# Patient Record
Sex: FEMALE | Race: WHITE | NOT HISPANIC OR LATINO | URBAN - METROPOLITAN AREA
[De-identification: names, ages, dates, MRNs, and addresses within clinical notes are randomized per-mention and may not be internally consistent; named-entity substitution may affect disease eponyms.]

---

## 2019-06-22 ENCOUNTER — EMERGENCY (EMERGENCY)
Facility: HOSPITAL | Age: 74
LOS: 0 days | Discharge: HOME | End: 2019-06-23
Attending: EMERGENCY MEDICINE | Admitting: EMERGENCY MEDICINE
Payer: MEDICARE

## 2019-06-22 VITALS
TEMPERATURE: 98 F | DIASTOLIC BLOOD PRESSURE: 57 MMHG | HEIGHT: 70 IN | RESPIRATION RATE: 17 BRPM | WEIGHT: 225.97 LBS | OXYGEN SATURATION: 96 % | HEART RATE: 132 BPM | SYSTOLIC BLOOD PRESSURE: 127 MMHG

## 2019-06-22 DIAGNOSIS — R55 SYNCOPE AND COLLAPSE: ICD-10-CM

## 2019-06-22 DIAGNOSIS — I10 ESSENTIAL (PRIMARY) HYPERTENSION: ICD-10-CM

## 2019-06-22 DIAGNOSIS — Z96.649 PRESENCE OF UNSPECIFIED ARTIFICIAL HIP JOINT: Chronic | ICD-10-CM

## 2019-06-22 DIAGNOSIS — Z90.710 ACQUIRED ABSENCE OF BOTH CERVIX AND UTERUS: Chronic | ICD-10-CM

## 2019-06-22 DIAGNOSIS — I48.91 UNSPECIFIED ATRIAL FIBRILLATION: ICD-10-CM

## 2019-06-22 DIAGNOSIS — Z98.89 OTHER SPECIFIED POSTPROCEDURAL STATES: Chronic | ICD-10-CM

## 2019-06-22 LAB
ALBUMIN SERPL ELPH-MCNC: 3.6 G/DL — SIGNIFICANT CHANGE UP (ref 3.3–5)
ALP SERPL-CCNC: 84 U/L — SIGNIFICANT CHANGE UP (ref 40–120)
ALT FLD-CCNC: 28 U/L — SIGNIFICANT CHANGE UP (ref 12–78)
ANION GAP SERPL CALC-SCNC: 10 MMOL/L — SIGNIFICANT CHANGE UP (ref 5–17)
APTT BLD: 33.6 SEC — SIGNIFICANT CHANGE UP (ref 27.5–36.3)
AST SERPL-CCNC: 27 U/L — SIGNIFICANT CHANGE UP (ref 15–37)
BILIRUB SERPL-MCNC: 0.2 MG/DL — SIGNIFICANT CHANGE UP (ref 0.2–1.2)
BUN SERPL-MCNC: 26 MG/DL — HIGH (ref 7–23)
CALCIUM SERPL-MCNC: 9.1 MG/DL — SIGNIFICANT CHANGE UP (ref 8.5–10.1)
CHLORIDE SERPL-SCNC: 105 MMOL/L — SIGNIFICANT CHANGE UP (ref 96–108)
CO2 SERPL-SCNC: 23 MMOL/L — SIGNIFICANT CHANGE UP (ref 22–31)
CREAT SERPL-MCNC: 1.48 MG/DL — HIGH (ref 0.5–1.3)
GLUCOSE SERPL-MCNC: 150 MG/DL — HIGH (ref 70–99)
HCT VFR BLD CALC: 38.1 % — SIGNIFICANT CHANGE UP (ref 34.5–45)
HGB BLD-MCNC: 12.4 G/DL — SIGNIFICANT CHANGE UP (ref 11.5–15.5)
INR BLD: 1.34 RATIO — HIGH (ref 0.88–1.16)
MAGNESIUM SERPL-MCNC: 2 MG/DL — SIGNIFICANT CHANGE UP (ref 1.6–2.6)
MCHC RBC-ENTMCNC: 30.5 PG — SIGNIFICANT CHANGE UP (ref 27–34)
MCHC RBC-ENTMCNC: 32.5 GM/DL — SIGNIFICANT CHANGE UP (ref 32–36)
MCV RBC AUTO: 93.8 FL — SIGNIFICANT CHANGE UP (ref 80–100)
PLATELET # BLD AUTO: 196 K/UL — SIGNIFICANT CHANGE UP (ref 150–400)
POTASSIUM SERPL-MCNC: 3.6 MMOL/L — SIGNIFICANT CHANGE UP (ref 3.5–5.3)
POTASSIUM SERPL-SCNC: 3.6 MMOL/L — SIGNIFICANT CHANGE UP (ref 3.5–5.3)
PROT SERPL-MCNC: 7.5 GM/DL — SIGNIFICANT CHANGE UP (ref 6–8.3)
PROTHROM AB SERPL-ACNC: 15 SEC — HIGH (ref 10–12.9)
RBC # BLD: 4.06 M/UL — SIGNIFICANT CHANGE UP (ref 3.8–5.2)
RBC # FLD: 13.6 % — SIGNIFICANT CHANGE UP (ref 10.3–14.5)
SODIUM SERPL-SCNC: 138 MMOL/L — SIGNIFICANT CHANGE UP (ref 135–145)
WBC # BLD: 7.85 K/UL — SIGNIFICANT CHANGE UP (ref 3.8–10.5)
WBC # FLD AUTO: 7.85 K/UL — SIGNIFICANT CHANGE UP (ref 3.8–10.5)

## 2019-06-22 PROCEDURE — 99284 EMERGENCY DEPT VISIT MOD MDM: CPT | Mod: 25

## 2019-06-22 PROCEDURE — 93010 ELECTROCARDIOGRAM REPORT: CPT

## 2019-06-22 PROCEDURE — 71045 X-RAY EXAM CHEST 1 VIEW: CPT | Mod: 26

## 2019-06-22 PROCEDURE — 70450 CT HEAD/BRAIN W/O DYE: CPT | Mod: 26

## 2019-06-22 RX ORDER — SODIUM CHLORIDE 9 MG/ML
2000 INJECTION INTRAMUSCULAR; INTRAVENOUS; SUBCUTANEOUS ONCE
Refills: 0 | Status: COMPLETED | OUTPATIENT
Start: 2019-06-22 | End: 2019-06-22

## 2019-06-22 RX ORDER — DILTIAZEM HCL 120 MG
60 CAPSULE, EXT RELEASE 24 HR ORAL ONCE
Refills: 0 | Status: COMPLETED | OUTPATIENT
Start: 2019-06-22 | End: 2019-06-22

## 2019-06-22 RX ORDER — DILTIAZEM HCL 120 MG
10 CAPSULE, EXT RELEASE 24 HR ORAL ONCE
Refills: 0 | Status: COMPLETED | OUTPATIENT
Start: 2019-06-22 | End: 2019-06-22

## 2019-06-22 RX ORDER — DILTIAZEM HCL 120 MG
20 CAPSULE, EXT RELEASE 24 HR ORAL ONCE
Refills: 0 | Status: COMPLETED | OUTPATIENT
Start: 2019-06-22 | End: 2019-06-22

## 2019-06-22 RX ADMIN — Medication 60 MILLIGRAM(S): at 21:56

## 2019-06-22 RX ADMIN — SODIUM CHLORIDE 2000 MILLILITER(S): 9 INJECTION INTRAMUSCULAR; INTRAVENOUS; SUBCUTANEOUS at 22:10

## 2019-06-22 RX ADMIN — Medication 10 MILLIGRAM(S): at 22:13

## 2019-06-22 RX ADMIN — Medication 20 MILLIGRAM(S): at 21:21

## 2019-06-22 NOTE — ED PROVIDER NOTE - PROGRESS NOTE DETAILS
AS:  received at shift change from Dr Chou, pending second troponin.  TRop neg x 2.  Pt anxious for d/c home.  Will f/u as out pt

## 2019-06-22 NOTE — ED PROVIDER NOTE - NS_ ATTENDINGSCRIBEDETAILS _ED_A_ED_FT
I, Damon Chou MD,  performed the initial face to face bedside interview with this patient regarding history of present illness, review of symptoms and relevant past medical, social and family history.  I completed an independent physical examination.    The history, relevant review of systems, past medical and surgical history, medical decision making, and physical examination was documented by the scribe in my presence and I attest to the accuracy of the documentation.

## 2019-06-22 NOTE — ED ADULT TRIAGE NOTE - CHIEF COMPLAINT QUOTE
Pt presents to ER s/p witnessed syncopal episode 25 minutes PTA. Pt slide out of chair. Denies hitting head. Pt had oblation on Friday r/t Afib

## 2019-06-22 NOTE — ED PROVIDER NOTE - OBJECTIVE STATEMENT
74 y/o female with a PMHx of Afib presents to the ED s/p syncope today. States that episode occurred while she was eating at a restaurant. Pt states that she had a couple glasses of wine at the restaurant. Friends at the pt's bedside states that they noticed the pt slump down and slide out of her seat to the floor. Pt states that she had a recent ablation at [facility] a week ago for Afib.

## 2019-06-22 NOTE — ED ADULT NURSE NOTE - NSIMPLEMENTINTERV_GEN_ALL_ED
Implemented All Fall Risk Interventions:  [place] to call system. Call bell, personal items and telephone within reach. Instruct patient to call for assistance. Room bathroom lighting operational. Non-slip footwear when patient is off stretcher. Physically safe environment: no spills, clutter or unnecessary equipment. Stretcher in lowest position, wheels locked, appropriate side rails in place. Provide visual cue, wrist band, yellow gown, etc. Monitor gait and stability. Monitor for mental status changes and reorient to person, place, and time. Review medications for side effects contributing to fall risk. Reinforce activity limits and safety measures with patient and family.

## 2019-06-22 NOTE — ED ADULT NURSE NOTE - OBJECTIVE STATEMENT
Pt biba s/p syncopal episode at restaurant.  Pt stated that she had ablation done last week and was placed on   amiodarone. Today she had 3 glasses of wine at home, then today at the restaurant  she had 1.5 prosecco. She was also on a keto diet and today was the first day she had bread.  One she was on her 2nd prosecco drink she synopsized and slipped out her chair onto the floor.

## 2019-06-22 NOTE — ED PROVIDER NOTE - NSFOLLOWUPINSTRUCTIONS_ED_ALL_ED_FT
Follow up with your doctor Monday  Stay well hydrated  Continue current medications  Return to ED for any further concerns    Syncope    Syncope is when you temporarily lose consciousness, also called fainting or passing out. It is caused by a sudden decrease in blood flow to the brain. Even though most causes of syncope are not dangerous, syncope can possibly be a sign of a serious medical problem. Signs that you may be about to faint include feeling dizzy, lightheaded, nausea, visual changes, or cold/clammy skin. Do not drive, operate heavy machinery, or play sports until your health care provider says it is okay.    SEEK IMMEDIATE MEDICAL CARE IF YOU HAVE ANY OF THE FOLLOWING SYMPTOMS: severe headache, pain in your chest/abdomen/back, bleeding from your mouth or rectum, palpitations, shortness of breath, pain with breathing, seizure, confusion, or trouble walking.

## 2019-06-23 VITALS
OXYGEN SATURATION: 100 % | DIASTOLIC BLOOD PRESSURE: 55 MMHG | HEART RATE: 92 BPM | RESPIRATION RATE: 18 BRPM | SYSTOLIC BLOOD PRESSURE: 120 MMHG | TEMPERATURE: 98 F

## 2019-06-23 LAB — TROPONIN I SERPL-MCNC: <0.015 NG/ML — SIGNIFICANT CHANGE UP (ref 0.01–0.04)

## 2020-10-06 ENCOUNTER — EMERGENCY (EMERGENCY)
Facility: HOSPITAL | Age: 75
LOS: 1 days | Discharge: HOME | End: 2020-10-06
Attending: EMERGENCY MEDICINE | Admitting: EMERGENCY MEDICINE
Payer: MEDICARE

## 2020-10-06 VITALS
SYSTOLIC BLOOD PRESSURE: 138 MMHG | TEMPERATURE: 98 F | DIASTOLIC BLOOD PRESSURE: 73 MMHG | RESPIRATION RATE: 16 BRPM | OXYGEN SATURATION: 98 % | HEART RATE: 116 BPM

## 2020-10-06 VITALS
HEART RATE: 125 BPM | OXYGEN SATURATION: 97 % | TEMPERATURE: 98 F | DIASTOLIC BLOOD PRESSURE: 82 MMHG | HEIGHT: 70 IN | WEIGHT: 229.94 LBS | SYSTOLIC BLOOD PRESSURE: 112 MMHG | RESPIRATION RATE: 18 BRPM

## 2020-10-06 DIAGNOSIS — Z96.649 PRESENCE OF UNSPECIFIED ARTIFICIAL HIP JOINT: Chronic | ICD-10-CM

## 2020-10-06 DIAGNOSIS — Z90.710 ACQUIRED ABSENCE OF BOTH CERVIX AND UTERUS: Chronic | ICD-10-CM

## 2020-10-06 DIAGNOSIS — Z98.89 OTHER SPECIFIED POSTPROCEDURAL STATES: Chronic | ICD-10-CM

## 2020-10-06 PROBLEM — I48.91 UNSPECIFIED ATRIAL FIBRILLATION: Chronic | Status: ACTIVE | Noted: 2019-06-29

## 2020-10-06 LAB
ALBUMIN SERPL ELPH-MCNC: 3.5 G/DL — SIGNIFICANT CHANGE UP (ref 3.3–5)
ALP SERPL-CCNC: 86 U/L — SIGNIFICANT CHANGE UP (ref 40–120)
ALT FLD-CCNC: 28 U/L — SIGNIFICANT CHANGE UP (ref 12–78)
ANION GAP SERPL CALC-SCNC: 6 MMOL/L — SIGNIFICANT CHANGE UP (ref 5–17)
AST SERPL-CCNC: 24 U/L — SIGNIFICANT CHANGE UP (ref 15–37)
BASOPHILS # BLD AUTO: 0.03 K/UL — SIGNIFICANT CHANGE UP (ref 0–0.2)
BASOPHILS NFR BLD AUTO: 0.4 % — SIGNIFICANT CHANGE UP (ref 0–2)
BILIRUB SERPL-MCNC: 0.3 MG/DL — SIGNIFICANT CHANGE UP (ref 0.2–1.2)
BUN SERPL-MCNC: 13 MG/DL — SIGNIFICANT CHANGE UP (ref 7–23)
CALCIUM SERPL-MCNC: 9.5 MG/DL — SIGNIFICANT CHANGE UP (ref 8.5–10.1)
CHLORIDE SERPL-SCNC: 106 MMOL/L — SIGNIFICANT CHANGE UP (ref 96–108)
CO2 SERPL-SCNC: 29 MMOL/L — SIGNIFICANT CHANGE UP (ref 22–31)
CREAT SERPL-MCNC: 1.1 MG/DL — SIGNIFICANT CHANGE UP (ref 0.5–1.3)
EOSINOPHIL # BLD AUTO: 0.09 K/UL — SIGNIFICANT CHANGE UP (ref 0–0.5)
EOSINOPHIL NFR BLD AUTO: 1.2 % — SIGNIFICANT CHANGE UP (ref 0–6)
GLUCOSE SERPL-MCNC: 117 MG/DL — HIGH (ref 70–99)
HCT VFR BLD CALC: 34.2 % — LOW (ref 34.5–45)
HGB BLD-MCNC: 11.3 G/DL — LOW (ref 11.5–15.5)
IMM GRANULOCYTES NFR BLD AUTO: 0.5 % — SIGNIFICANT CHANGE UP (ref 0–1.5)
LYMPHOCYTES # BLD AUTO: 1.82 K/UL — SIGNIFICANT CHANGE UP (ref 1–3.3)
LYMPHOCYTES # BLD AUTO: 23.5 % — SIGNIFICANT CHANGE UP (ref 13–44)
MCHC RBC-ENTMCNC: 27.5 PG — SIGNIFICANT CHANGE UP (ref 27–34)
MCHC RBC-ENTMCNC: 33 GM/DL — SIGNIFICANT CHANGE UP (ref 32–36)
MCV RBC AUTO: 83.2 FL — SIGNIFICANT CHANGE UP (ref 80–100)
MONOCYTES # BLD AUTO: 0.63 K/UL — SIGNIFICANT CHANGE UP (ref 0–0.9)
MONOCYTES NFR BLD AUTO: 8.1 % — SIGNIFICANT CHANGE UP (ref 2–14)
NEUTROPHILS # BLD AUTO: 5.14 K/UL — SIGNIFICANT CHANGE UP (ref 1.8–7.4)
NEUTROPHILS NFR BLD AUTO: 66.3 % — SIGNIFICANT CHANGE UP (ref 43–77)
NRBC # BLD: 0 /100 WBCS — SIGNIFICANT CHANGE UP (ref 0–0)
NT-PROBNP SERPL-SCNC: 1080 PG/ML — HIGH (ref 0–450)
PLATELET # BLD AUTO: 236 K/UL — SIGNIFICANT CHANGE UP (ref 150–400)
POTASSIUM SERPL-MCNC: 4 MMOL/L — SIGNIFICANT CHANGE UP (ref 3.5–5.3)
POTASSIUM SERPL-SCNC: 4 MMOL/L — SIGNIFICANT CHANGE UP (ref 3.5–5.3)
PROT SERPL-MCNC: 7.6 G/DL — SIGNIFICANT CHANGE UP (ref 6–8.3)
RBC # BLD: 4.11 M/UL — SIGNIFICANT CHANGE UP (ref 3.8–5.2)
RBC # FLD: 18.5 % — HIGH (ref 10.3–14.5)
SODIUM SERPL-SCNC: 141 MMOL/L — SIGNIFICANT CHANGE UP (ref 135–145)
TROPONIN I SERPL-MCNC: <.015 NG/ML — SIGNIFICANT CHANGE UP (ref 0.01–0.04)
WBC # BLD: 7.75 K/UL — SIGNIFICANT CHANGE UP (ref 3.8–10.5)
WBC # FLD AUTO: 7.75 K/UL — SIGNIFICANT CHANGE UP (ref 3.8–10.5)

## 2020-10-06 PROCEDURE — 80053 COMPREHEN METABOLIC PANEL: CPT

## 2020-10-06 PROCEDURE — 83880 ASSAY OF NATRIURETIC PEPTIDE: CPT

## 2020-10-06 PROCEDURE — 99284 EMERGENCY DEPT VISIT MOD MDM: CPT | Mod: 25

## 2020-10-06 PROCEDURE — 84484 ASSAY OF TROPONIN QUANT: CPT

## 2020-10-06 PROCEDURE — 99283 EMERGENCY DEPT VISIT LOW MDM: CPT

## 2020-10-06 PROCEDURE — 71045 X-RAY EXAM CHEST 1 VIEW: CPT

## 2020-10-06 PROCEDURE — 93010 ELECTROCARDIOGRAM REPORT: CPT

## 2020-10-06 PROCEDURE — 71045 X-RAY EXAM CHEST 1 VIEW: CPT | Mod: 26

## 2020-10-06 PROCEDURE — 93005 ELECTROCARDIOGRAM TRACING: CPT

## 2020-10-06 PROCEDURE — 36415 COLL VENOUS BLD VENIPUNCTURE: CPT

## 2020-10-06 PROCEDURE — 85025 COMPLETE CBC W/AUTO DIFF WBC: CPT

## 2020-10-06 RX ORDER — DILTIAZEM HCL 120 MG
30 CAPSULE, EXT RELEASE 24 HR ORAL ONCE
Refills: 0 | Status: COMPLETED | OUTPATIENT
Start: 2020-10-06 | End: 2020-10-06

## 2020-10-06 RX ADMIN — Medication 30 MILLIGRAM(S): at 12:56

## 2020-10-06 NOTE — ED PROVIDER NOTE - PATIENT PORTAL LINK FT
You can access the FollowMyHealth Patient Portal offered by [facility] by registering at the following website: [email or web address]. By joining [employer]’s FollowMyHealth portal, you will also be able to view your health information using other applications (apps) compatible with our system.

## 2020-10-06 NOTE — ED PROVIDER NOTE - PROGRESS NOTE DETAILS
case shahana Anthony, ekg from last month shows afib 112.  If hr about 110 and s symptoms, can be dc home.  Pt is scheduled for pacemaker.

## 2020-10-06 NOTE — ED PROVIDER NOTE - NSFOLLOWUPINSTRUCTIONS_ED_ALL_ED_FT
[employer]® CareNotes®     :  [facility]  	                       A-FIB (ATRIAL FIBRILLATION) - Discharge Care           A-fib (Atrial Fibrillation)    WHAT YOU NEED TO KNOW:    A-fib may come and go, or it may be a long-term condition. A-fib can cause blood clots, stroke, or heart failure. These conditions may become life-threatening. It is important to treat and manage a-fib to help prevent a blood clot, stroke, or heart failure.     Heart Chambers         DISCHARGE INSTRUCTIONS:    Call your local emergency number (911 in the ) if:   •You have any of the following signs of a heart attack: ?Squeezing, pressure, or pain in your chest      ?You may also have any of the following: ?Discomfort or pain in your back, neck, jaw, stomach, or arm      ?Shortness of breath      ?Nausea or vomiting      ?Lightheadedness or a sudden cold sweat        •You have any of the following signs of a stroke: ?Numbness or drooping on one side of your face       ?Weakness in an arm or leg      ?Confusion or difficulty speaking      ?Dizziness, a severe headache, or vision loss        Call your cardiologist if:   •Your arm or leg feels warm, tender, and painful. It may look swollen and red.      •Your heart rate is more than 110 beats per minute.       •You have new or worsening swelling in your legs, feet, ankles, or abdomen.       •You are short of breath, even at rest.       •You have questions or concerns about your condition or care.       Medicines: You may need any of the following:   •Heart medicines help control your heart rate or rhythm. You may need more than one medicine to treat your symptoms.       •Blood thinners help prevent blood clots. Clots can cause strokes, heart attacks, and death. The following are general safety guidelines to follow while you are taking a blood thinner:?Watch for bleeding and bruising while you take blood thinners. Watch for bleeding from your gums or nose. Watch for blood in your urine and bowel movements. Use a soft washcloth on your skin, and a soft toothbrush to brush your teeth. This can keep your skin and gums from bleeding. If you shave, use an electric shaver. Do not play contact sports.       ?Tell your dentist and other healthcare providers that you take a blood thinner. Wear a bracelet or necklace that says you take this medicine.       ?Do not start or stop any other medicines unless your healthcare provider tells you to. Many medicines cannot be used with blood thinners.      ?Take your blood thinner exactly as prescribed by your healthcare provider. Do not skip does or take less than prescribed. Tell your provider right away if you forget to take your blood thinner, or if you take too much.      ?Warfarin is a blood thinner that you may need to take. The following are things you should be aware of if you take warfarin: ?Foods and medicines can affect the amount of warfarin in your blood. Do not make major changes to your diet while you take warfarin. Warfarin works best when you eat about the same amount of vitamin K every day. Vitamin K is found in green leafy vegetables and certain other foods. Ask for more information about what to eat when you are taking warfarin.      ?You will need to see your healthcare provider for follow-up visits when you are on warfarin. You will need regular blood tests. These tests are used to decide how much medicine you need.         •Antiplatelets, such as aspirin, help prevent blood clots. Take your antiplatelet medicine exactly as directed. These medicines make it more likely for you to bleed or bruise. If you are told to take aspirin, do not take acetaminophen or ibuprofen instead.       •Take your medicine as directed. Contact your healthcare provider if you think your medicine is not helping or if you have side effects. Tell him or her if you are allergic to any medicine. Keep a list of the medicines, vitamins, and herbs you take. Include the amounts, and when and why you take them. Bring the list or the pill bottles to follow-up visits. Carry your medicine list with you in case of an emergency.      Manage A-fib:   •Know your target heart rate. Learn how to check your pulse and monitor your heart rate.      •Know the risks if you choose to drink alcohol. Alcohol can make a-fib hard to manage. Ask your healthcare provider if it is safe for you to drink alcohol. A drink of alcohol is 12 ounces of beer, 5 ounces of wine, or 1½ ounces of liquor.       •Do not smoke. Nicotine can cause heart damage and make it more difficult to manage your a-fib. Do not use e-cigarettes or smokeless tobacco in place of cigarettes or to help you quit. They still contain nicotine. Ask your healthcare provider for information if you currently smoke and need help quitting.      •Eat heart-healthy foods. Heart healthy foods will help keep your cholesterol low. These include fruits, vegetables, whole-grain breads, low-fat dairy products, beans, lean meats, and fish. Replace butter and margarine with heart-healthy oils such as olive oil and canola oil.       •Maintain a healthy weight. Ask your healthcare provider how much you should weigh. Ask him or her to help you create a safe weight loss plan if you are overweight. Even a small goal of a 10% weight loss can improve your heart health.       •Get regular physical activity. Physical activity helps improve your heart health. Get at least 150 minutes of moderate aerobic physical activity each week. Your healthcare provider can help you create an activity plan.   Walking for Exercise           •Manage other health conditions. This includes high blood pressure or cholesterol, sleep apnea, diabetes, and other heart conditions. Take medicine as directed and follow your treatment plan.       Follow up with your cardiologist as directed: You will need regular blood tests and monitoring. Write down your questions so you remember to ask them during your visits.        © Copyright [employer] 2020           back to top                          © Copyright [employer] 2020

## 2020-10-06 NOTE — ED ADULT NURSE NOTE - CHIEF COMPLAINT QUOTE
patient came in ED from Urgent care BIBA with c/o cough and on Afib rhythm. patient denies chest pain, denies dizziness.

## 2020-10-06 NOTE — ED ADULT NURSE NOTE - PSH
History of colonoscopy  (2014)  S/P hip replacement  (Left hip, 2010)  S/P hysterectomy with oophorectomy  (Age 45)

## 2020-10-06 NOTE — ED ADULT NURSE NOTE - OBJECTIVE STATEMENT
Pt sent from urgent care with Afib with HR in 120's. Per pt this is normal. She states shes ha AFIB "for years" and the elevated rate is common. Pt is due to have a pacemaker placed next week. Pt denies CP, SOB, and dizziness.

## 2021-03-18 ENCOUNTER — INPATIENT (INPATIENT)
Facility: HOSPITAL | Age: 76
LOS: 2 days | Discharge: HOME | DRG: 378 | End: 2021-03-21
Attending: INTERNAL MEDICINE | Admitting: INTERNAL MEDICINE
Payer: MEDICARE

## 2021-03-18 VITALS
TEMPERATURE: 98 F | HEIGHT: 70 IN | OXYGEN SATURATION: 94 % | DIASTOLIC BLOOD PRESSURE: 75 MMHG | RESPIRATION RATE: 16 BRPM | HEART RATE: 77 BPM | SYSTOLIC BLOOD PRESSURE: 106 MMHG | WEIGHT: 201.94 LBS

## 2021-03-18 DIAGNOSIS — Z29.9 ENCOUNTER FOR PROPHYLACTIC MEASURES, UNSPECIFIED: ICD-10-CM

## 2021-03-18 DIAGNOSIS — K44.9 DIAPHRAGMATIC HERNIA WITHOUT OBSTRUCTION OR GANGRENE: ICD-10-CM

## 2021-03-18 DIAGNOSIS — R55 SYNCOPE AND COLLAPSE: ICD-10-CM

## 2021-03-18 DIAGNOSIS — E78.5 HYPERLIPIDEMIA, UNSPECIFIED: ICD-10-CM

## 2021-03-18 DIAGNOSIS — D64.9 ANEMIA, UNSPECIFIED: ICD-10-CM

## 2021-03-18 DIAGNOSIS — Z98.89 OTHER SPECIFIED POSTPROCEDURAL STATES: Chronic | ICD-10-CM

## 2021-03-18 DIAGNOSIS — I48.91 UNSPECIFIED ATRIAL FIBRILLATION: ICD-10-CM

## 2021-03-18 DIAGNOSIS — N18.9 CHRONIC KIDNEY DISEASE, UNSPECIFIED: ICD-10-CM

## 2021-03-18 DIAGNOSIS — I10 ESSENTIAL (PRIMARY) HYPERTENSION: ICD-10-CM

## 2021-03-18 DIAGNOSIS — Z90.710 ACQUIRED ABSENCE OF BOTH CERVIX AND UTERUS: Chronic | ICD-10-CM

## 2021-03-18 DIAGNOSIS — I71.2 THORACIC AORTIC ANEURYSM, WITHOUT RUPTURE: ICD-10-CM

## 2021-03-18 DIAGNOSIS — Z96.649 PRESENCE OF UNSPECIFIED ARTIFICIAL HIP JOINT: Chronic | ICD-10-CM

## 2021-03-18 DIAGNOSIS — I25.10 ATHEROSCLEROTIC HEART DISEASE OF NATIVE CORONARY ARTERY WITHOUT ANGINA PECTORIS: ICD-10-CM

## 2021-03-18 LAB
ALBUMIN SERPL ELPH-MCNC: 3.5 G/DL — SIGNIFICANT CHANGE UP (ref 3.3–5)
ALP SERPL-CCNC: 88 U/L — SIGNIFICANT CHANGE UP (ref 40–120)
ALT FLD-CCNC: 16 U/L — SIGNIFICANT CHANGE UP (ref 12–78)
ANION GAP SERPL CALC-SCNC: 10 MMOL/L — SIGNIFICANT CHANGE UP (ref 5–17)
ANISOCYTOSIS BLD QL: SIGNIFICANT CHANGE UP
APPEARANCE UR: ABNORMAL
APTT BLD: 36.1 SEC — HIGH (ref 27.5–35.5)
AST SERPL-CCNC: 14 U/L — LOW (ref 15–37)
BACTERIA # UR AUTO: ABNORMAL
BASOPHILS # BLD AUTO: 0.03 K/UL — SIGNIFICANT CHANGE UP (ref 0–0.2)
BASOPHILS NFR BLD AUTO: 0.4 % — SIGNIFICANT CHANGE UP (ref 0–2)
BILIRUB SERPL-MCNC: 0.3 MG/DL — SIGNIFICANT CHANGE UP (ref 0.2–1.2)
BILIRUB UR-MCNC: NEGATIVE — SIGNIFICANT CHANGE UP
BLD GP AB SCN SERPL QL: SIGNIFICANT CHANGE UP
BUN SERPL-MCNC: 31 MG/DL — HIGH (ref 7–23)
CALCIUM SERPL-MCNC: 9.4 MG/DL — SIGNIFICANT CHANGE UP (ref 8.5–10.1)
CHLORIDE SERPL-SCNC: 103 MMOL/L — SIGNIFICANT CHANGE UP (ref 96–108)
CK MB CFR SERPL CALC: <1 NG/ML — SIGNIFICANT CHANGE UP (ref 0–3.6)
CO2 SERPL-SCNC: 25 MMOL/L — SIGNIFICANT CHANGE UP (ref 22–31)
COLOR SPEC: YELLOW — SIGNIFICANT CHANGE UP
COMMENT - URINE: SIGNIFICANT CHANGE UP
CREAT SERPL-MCNC: 1.2 MG/DL — SIGNIFICANT CHANGE UP (ref 0.5–1.3)
DIFF PNL FLD: NEGATIVE — SIGNIFICANT CHANGE UP
EOSINOPHIL # BLD AUTO: 0.03 K/UL — SIGNIFICANT CHANGE UP (ref 0–0.5)
EOSINOPHIL NFR BLD AUTO: 0.4 % — SIGNIFICANT CHANGE UP (ref 0–6)
EPI CELLS # UR: ABNORMAL
GLUCOSE SERPL-MCNC: 122 MG/DL — HIGH (ref 70–99)
GLUCOSE UR QL: NEGATIVE — SIGNIFICANT CHANGE UP
HCT VFR BLD CALC: 26 % — LOW (ref 34.5–45)
HGB BLD-MCNC: 7.8 G/DL — LOW (ref 11.5–15.5)
HYPOCHROMIA BLD QL: SLIGHT — SIGNIFICANT CHANGE UP
IMM GRANULOCYTES NFR BLD AUTO: 0.5 % — SIGNIFICANT CHANGE UP (ref 0–1.5)
INR BLD: 2.59 RATIO — HIGH (ref 0.88–1.16)
KETONES UR-MCNC: ABNORMAL
LEUKOCYTE ESTERASE UR-ACNC: NEGATIVE — SIGNIFICANT CHANGE UP
LIDOCAIN IGE QN: 145 U/L — SIGNIFICANT CHANGE UP (ref 73–393)
LYMPHOCYTES # BLD AUTO: 1.02 K/UL — SIGNIFICANT CHANGE UP (ref 1–3.3)
LYMPHOCYTES # BLD AUTO: 13.1 % — SIGNIFICANT CHANGE UP (ref 13–44)
MACROCYTES BLD QL: SLIGHT — SIGNIFICANT CHANGE UP
MAGNESIUM SERPL-MCNC: 2 MG/DL — SIGNIFICANT CHANGE UP (ref 1.6–2.6)
MANUAL SMEAR VERIFICATION: SIGNIFICANT CHANGE UP
MCHC RBC-ENTMCNC: 27.4 PG — SIGNIFICANT CHANGE UP (ref 27–34)
MCHC RBC-ENTMCNC: 30 GM/DL — LOW (ref 32–36)
MCV RBC AUTO: 91.2 FL — SIGNIFICANT CHANGE UP (ref 80–100)
MICROCYTES BLD QL: SLIGHT — SIGNIFICANT CHANGE UP
MONOCYTES # BLD AUTO: 0.48 K/UL — SIGNIFICANT CHANGE UP (ref 0–0.9)
MONOCYTES NFR BLD AUTO: 6.2 % — SIGNIFICANT CHANGE UP (ref 2–14)
NEUTROPHILS # BLD AUTO: 6.2 K/UL — SIGNIFICANT CHANGE UP (ref 1.8–7.4)
NEUTROPHILS NFR BLD AUTO: 79.4 % — HIGH (ref 43–77)
NITRITE UR-MCNC: NEGATIVE — SIGNIFICANT CHANGE UP
NRBC # BLD: 0 /100 WBCS — SIGNIFICANT CHANGE UP (ref 0–0)
NT-PROBNP SERPL-SCNC: 1084 PG/ML — HIGH (ref 0–450)
PH UR: 5 — SIGNIFICANT CHANGE UP (ref 5–8)
PLAT MORPH BLD: NORMAL — SIGNIFICANT CHANGE UP
PLATELET # BLD AUTO: 321 K/UL — SIGNIFICANT CHANGE UP (ref 150–400)
POIKILOCYTOSIS BLD QL AUTO: SLIGHT — SIGNIFICANT CHANGE UP
POLYCHROMASIA BLD QL SMEAR: SLIGHT — SIGNIFICANT CHANGE UP
POTASSIUM SERPL-MCNC: 3.3 MMOL/L — LOW (ref 3.5–5.3)
POTASSIUM SERPL-SCNC: 3.3 MMOL/L — LOW (ref 3.5–5.3)
PROT SERPL-MCNC: 7.3 G/DL — SIGNIFICANT CHANGE UP (ref 6–8.3)
PROT UR-MCNC: NEGATIVE — SIGNIFICANT CHANGE UP
PROTHROM AB SERPL-ACNC: 29 SEC — HIGH (ref 10.6–13.6)
RBC # BLD: 2.85 M/UL — LOW (ref 3.8–5.2)
RBC # FLD: 20.5 % — HIGH (ref 10.3–14.5)
RBC BLD AUTO: ABNORMAL
SARS-COV-2 RNA SPEC QL NAA+PROBE: SIGNIFICANT CHANGE UP
SODIUM SERPL-SCNC: 138 MMOL/L — SIGNIFICANT CHANGE UP (ref 135–145)
SP GR SPEC: 1.02 — SIGNIFICANT CHANGE UP (ref 1.01–1.02)
TROPONIN I SERPL-MCNC: <.015 NG/ML — SIGNIFICANT CHANGE UP (ref 0.01–0.04)
TSH SERPL-MCNC: 1.26 UIU/ML — SIGNIFICANT CHANGE UP (ref 0.36–3.74)
UROBILINOGEN FLD QL: NEGATIVE — SIGNIFICANT CHANGE UP
WBC # BLD: 7.8 K/UL — SIGNIFICANT CHANGE UP (ref 3.8–10.5)
WBC # FLD AUTO: 7.8 K/UL — SIGNIFICANT CHANGE UP (ref 3.8–10.5)
WBC UR QL: SIGNIFICANT CHANGE UP

## 2021-03-18 PROCEDURE — 99223 1ST HOSP IP/OBS HIGH 75: CPT | Mod: GC,AI

## 2021-03-18 PROCEDURE — 99285 EMERGENCY DEPT VISIT HI MDM: CPT | Mod: CS

## 2021-03-18 PROCEDURE — 71045 X-RAY EXAM CHEST 1 VIEW: CPT | Mod: 26

## 2021-03-18 RX ORDER — PANTOPRAZOLE SODIUM 20 MG/1
40 TABLET, DELAYED RELEASE ORAL
Refills: 0 | Status: DISCONTINUED | OUTPATIENT
Start: 2021-03-18 | End: 2021-03-20

## 2021-03-18 RX ORDER — POTASSIUM CHLORIDE 20 MEQ
40 PACKET (EA) ORAL ONCE
Refills: 0 | Status: COMPLETED | OUTPATIENT
Start: 2021-03-18 | End: 2021-03-18

## 2021-03-18 RX ORDER — ACETAMINOPHEN 500 MG
650 TABLET ORAL ONCE
Refills: 0 | Status: COMPLETED | OUTPATIENT
Start: 2021-03-18 | End: 2021-03-18

## 2021-03-18 RX ORDER — FERROUS SULFATE 325(65) MG
325 TABLET ORAL DAILY
Refills: 0 | Status: DISCONTINUED | OUTPATIENT
Start: 2021-03-18 | End: 2021-03-21

## 2021-03-18 RX ORDER — METOPROLOL TARTRATE 50 MG
1 TABLET ORAL
Qty: 0 | Refills: 0 | DISCHARGE

## 2021-03-18 RX ORDER — SODIUM CHLORIDE 9 MG/ML
1000 INJECTION INTRAMUSCULAR; INTRAVENOUS; SUBCUTANEOUS ONCE
Refills: 0 | Status: COMPLETED | OUTPATIENT
Start: 2021-03-18 | End: 2021-03-18

## 2021-03-18 RX ORDER — FERROUS SULFATE 325(65) MG
1 TABLET ORAL
Qty: 0 | Refills: 0 | DISCHARGE

## 2021-03-18 RX ORDER — LIDOCAINE 4 G/100G
1 CREAM TOPICAL ONCE
Refills: 0 | Status: COMPLETED | OUTPATIENT
Start: 2021-03-18 | End: 2021-03-18

## 2021-03-18 RX ORDER — LANOLIN ALCOHOL/MO/W.PET/CERES
5 CREAM (GRAM) TOPICAL AT BEDTIME
Refills: 0 | Status: DISCONTINUED | OUTPATIENT
Start: 2021-03-18 | End: 2021-03-21

## 2021-03-18 RX ORDER — RIVAROXABAN 15 MG-20MG
1 KIT ORAL
Qty: 0 | Refills: 0 | DISCHARGE

## 2021-03-18 RX ORDER — ATORVASTATIN CALCIUM 80 MG/1
10 TABLET, FILM COATED ORAL AT BEDTIME
Refills: 0 | Status: DISCONTINUED | OUTPATIENT
Start: 2021-03-18 | End: 2021-03-21

## 2021-03-18 RX ADMIN — Medication 5 MILLIGRAM(S): at 21:25

## 2021-03-18 RX ADMIN — Medication 325 MILLIGRAM(S): at 15:45

## 2021-03-18 RX ADMIN — Medication 40 MILLIEQUIVALENT(S): at 15:45

## 2021-03-18 RX ADMIN — SODIUM CHLORIDE 1000 MILLILITER(S): 9 INJECTION INTRAMUSCULAR; INTRAVENOUS; SUBCUTANEOUS at 12:10

## 2021-03-18 RX ADMIN — LIDOCAINE 1 PATCH: 4 CREAM TOPICAL at 21:25

## 2021-03-18 RX ADMIN — Medication 650 MILLIGRAM(S): at 21:18

## 2021-03-18 RX ADMIN — SODIUM CHLORIDE 1000 MILLILITER(S): 9 INJECTION INTRAMUSCULAR; INTRAVENOUS; SUBCUTANEOUS at 11:10

## 2021-03-18 RX ADMIN — PANTOPRAZOLE SODIUM 40 MILLIGRAM(S): 20 TABLET, DELAYED RELEASE ORAL at 17:53

## 2021-03-18 RX ADMIN — Medication 650 MILLIGRAM(S): at 20:15

## 2021-03-18 RX ADMIN — ATORVASTATIN CALCIUM 10 MILLIGRAM(S): 80 TABLET, FILM COATED ORAL at 21:25

## 2021-03-18 NOTE — ED ADULT TRIAGE NOTE - CHIEF COMPLAINT QUOTE
pt sent from cardioogist office for dizziness, near syncope, pt pale, denies chest pain, c/o headaches with chronic sob, also hx of anemia and not taking iron as prescribed

## 2021-03-18 NOTE — H&P ADULT - NSHPSOCIALHISTORY_GEN_ALL_CORE
Lives alone. Former smoker quit 30 years ago, smoked 1 ppd for 20 years previously, social drinker Lives alone. Former smoker quit 30 years ago, smoked 1 ppd for 20 years previously, social drinker  no family hx of colon cancer

## 2021-03-18 NOTE — H&P ADULT - NSICDXFAMILYHX_GEN_ALL_CORE_FT
FAMILY HISTORY:  Mother  Still living? No  Family history of abdominal aortic aneurysm, Age at diagnosis: Age Unknown    Sibling  Still living? Yes, Estimated age: 71-80  Family history of cardiomyopathy, Age at diagnosis: Age Unknown

## 2021-03-18 NOTE — H&P ADULT - PROBLEM SELECTOR PLAN 1
Pt has a hx of chronic iron deficiency of anemia of unknown etiology after evaluation with upper and lower EGD by Dante  presented with symptomatic anemia, Hgb on admission 7.8  - s/p 1 unit in the ED  - Continue ferrous sulfate  - GI consult (Dr. Hooker Pt has a hx of chronic iron deficiency of anemia of unknown etiology after evaluation with upper and lower EGD by Dante and presented with symptomatic anemia after not taking iron tabs due to constipation, Hgb on admission 7.8  - No signs of active bleeding  - s/p 1 unit in the ED  - Continue ferrous sulfate  - GI consult (Dr. Hooker), f/u recs  - Start IV protonix 40 mg BID  - Check FOBT  - Hold Xarelto  - Monitor CBCs, transfuse for Hgb <8 Pt has a hx of chronic iron deficiency of anemia of unknown etiology after evaluation with upper and lower EGD by Dante and presented with symptomatic anemia after not taking iron tabs due to constipation, Hgb on admission 7.8  - No signs of active bleeding  - s/p 1 unit PRBC in the ED  - Continue ferrous sulfate  - GI consult (Dr. Hooker), f/u recs  - Start IV protonix 40 mg BID  - Check FOBT  - Hold Xarelto  - Monitor CBCs, transfuse for Hgb <8 Pt has a hx of chronic iron deficiency of anemia of unknown etiology after evaluation with upper and lower EGD by Dante and presented with symptomatic anemia after not taking iron tabs due to constipation, Hgb on admission 7.8  - No signs of active bleeding  - s/p 1 unit PRBC in the ED  - Continue ferrous sulfate  - GI consult (Dr. Hooker), f/u recs  - Start IV protonix 40 mg BID  - Check FOBT  - Hold Xarelto  - Monitor CBCs, transfuse for Hgb <8  -monitor volume status closely in setting of receiving IVF in ED and also 1U PRBC

## 2021-03-18 NOTE — H&P ADULT - NSICDXPASTMEDICALHX_GEN_ALL_CORE_FT
PAST MEDICAL HISTORY:  Afib     Afib     Hypertension     Thoracic aortic aneurysm (Stable at 4.1cm since 2012)

## 2021-03-18 NOTE — H&P ADULT - PROBLEM SELECTOR PLAN 3
Chronic, stable, s/p ppm 10/20  - EKG on admission Afib rate controlled  - Hold home Xarelto, Diltiazem, Metoprolol, and Valsartan-Hctz due to anemia and hypotension  - Monitor on remote tele  - Cardio consulted Dr. Britt

## 2021-03-18 NOTE — ED ADULT NURSE NOTE - OBJECTIVE STATEMENT
Received the patient in the Er. patient is alert and oriented. Skin warm and dry. C/O dizziness and near syncopal episode. AS per patient she is anemic. Denies any bleeding.

## 2021-03-18 NOTE — H&P ADULT - PROBLEM SELECTOR PLAN 4
Hx of htn on Diltiazem, Metoprolol, and Valsartan-Hctz   - Hypotensive on admission with SBP 99  - Hold anti-hypertensives for now   - Monitor hemodynamics

## 2021-03-18 NOTE — H&P ADULT - PROBLEM SELECTOR PLAN 7
GFR on admission 44, baseline per prior hospital visits ~35-50  - Trend renal indices Chronic, stable   - s/p repair in 7/20 with thoracic surgeon Dr. Larios  - Caitie WILKINS protonix BID

## 2021-03-18 NOTE — H&P ADULT - PROBLEM SELECTOR PLAN 8
Chemical VTE ppx contraindicated at this time due to anemia, Xarelto held  - SCDs GFR on admission 44, baseline per prior hospital visits ~35-50  - Trend renal indices GFR on admission 44, baseline per prior hospital visits ~35-50  - Trend renal indices  - avoid nephrotic agents GFR on admission 44, baseline per prior hospital visits ~35-50  - Trend renal indices  - avoid nephrotoxic agents

## 2021-03-18 NOTE — H&P ADULT - PROBLEM SELECTOR PLAN 5
Chronic, stable  - Continue Statin Chronic, stable diagnosed by cardiac catheterization in 8/2014, no hx of stents  - follows with Dr. Nesbitt   - Continue statin   - Cardio consulted Dr. Britt   - Transfuse for Hgb <8

## 2021-03-18 NOTE — ED PROVIDER NOTE - OBJECTIVE STATEMENT
75 female sent to ER by ambulance from cardiology office Dr. Tom with report of near syncope. Patient states since Monday she has been feeling light headed and feeling like she is going to pass out, when standing and walking, better when sitting and lying down, patient states she always has shortness of breath and is not new, denies chest pain, no fever, went to see her cardilogist and was told her blood pressure was low and that she may be anemic.

## 2021-03-18 NOTE — ED ADULT NURSE NOTE - PMH
Chetna Lopez  6/15/1936    Specialist or PCP: Neisha Mike,   Symptoms: 2 SORE SPOTS ON STOMACH  Call Back #: [phone] (home)  Transferred call to:  MESSAGE TO TRIAGE     Afib    Afib    Hypertension    Thoracic aortic aneurysm  (Stable at 4.1cm since 2012)

## 2021-03-18 NOTE — H&P ADULT - PROBLEM SELECTOR PLAN 2
Pt presented with 4 day hx of pre-syncope with positive orthostatics in outpatient office  - Likely 2/2 to anemia   - Plan as above  - Check orthostatics  - Cardio consulted (Dr. Britt) - check TTE, hold anti-hypertensives for now  - Remote tele and hemodynamic monitoring

## 2021-03-18 NOTE — PROGRESS NOTE ADULT - SUBJECTIVE AND OBJECTIVE BOX
Pt seen in office by my partner Dr Nesbitt, 2 hours ago. Presented there pale appearing, with orthostatic hypotension, presyncope. Hgb in ER 7.8. EKG rate controlled AF. Ok to hold Xarelto for now. Rec PRBC and GI consult. Will follow.

## 2021-03-18 NOTE — H&P ADULT - ASSESSMENT
Pt is a 74 yo F with a PMHx of HTN, HLD, afib on xarelto s/p ppm in 10/20, hiatal hernia s/p repair in 7/20, chronic iron deficiency anemia, CKD, thoracic aortic aneurysm who presents with presyncope likely 2/2 to anemia Pt is a 74 yo F with a PMHx of HTN, HLD, afib on xarelto s/p ppm in 10/20, hiatal hernia s/p repair in 7/20, chronic iron deficiency anemia, CKD, thoracic aortic aneurysm, CAD, and chronic exertional dyspnea  who presents with presyncope likely 2/2 to anemia Pt is a 74 yo F with a PMHx of HTN, HLD, afib on xarelto s/p ppm in 10/20, hiatal hernia s/p repair in 7/20, chronic iron deficiency anemia, CKD, thoracic aortic aneurysm, CAD, and chronic exertional dyspnea  who presents with presyncope likely 2/2 to anemia and orthostatic hypotension

## 2021-03-18 NOTE — H&P ADULT - NSICDXPASTSURGICALHX_GEN_ALL_CORE_FT
PAST SURGICAL HISTORY:  History of colonoscopy (2014)    S/P hip replacement (Left hip, 2010)    S/P hysterectomy with oophorectomy (Age 45)

## 2021-03-18 NOTE — H&P ADULT - NSHPREVIEWOFSYSTEMS_GEN_ALL_CORE
CONSTITUTIONAL: admits lightheadedness, denies fever, chills  HEENT: denies blurred vision, sore throat  SKIN: denies new lesions, rash  CARDIOVASCULAR: denies chest pain, chest pressure, palpitations  RESPIRATORY: admits chronic shortness of breath, denies sputum production  GASTROINTESTINAL: denies nausea, vomiting, diarrhea, abdominal pain, melena or hematochezia   GENITOURINARY: denies dysuria, discharge  NEUROLOGICAL: denies numbness, headache, focal weakness  MUSCULOSKELETAL: denies new joint pain, muscle aches  HEMATOLOGIC: denies gross bleeding, bruising  LYMPHATICS: denies enlarged lymph nodes, extremity swelling  PSYCHIATRIC: denies recent changes in anxiety, depression  ENDOCRINOLOGIC: denies sweating, cold or heat intolerance

## 2021-03-18 NOTE — H&P ADULT - PROBLEM SELECTOR PLAN 10
Chemical VTE ppx contraindicated at this time due to anemia, Xarelto held  - SCDs    11. Chronic exertional dyspnea  - Pt has a hx of chronic shortness of breather  - Former smoker, quit 30 years ago, smoked 1 ppd for 20 years  - Advised outpatient follow up with Pulm, possibly COPD-component     12. Hyperkalemia  - K on admission 3.3  - replete with 40 po K x 1  - Monitor CMPs, replete PRN Chemical VTE ppx contraindicated at this time due to anemia, Xarelto held  - SCDs    11. Chronic exertional dyspnea  - Pt has a hx of chronic shortness of breathe  - Former smoker, quit 30 years ago, smoked 1 ppd for 20 years  - Advised outpatient follow up with Pulm, possibly COPD-component     12. Hyperkalemia  - K on admission 3.3  - replete with 40 po K x 1  - Monitor CMPs, replete PRN Chemical VTE ppx contraindicated at this time due to anemia, Xarelto held  - SCDs    11. Chronic exertional dyspnea  - Pt has a hx of chronic shortness of breath  - Former smoker, quit 30 years ago, smoked 1 ppd for 20 years  - Advised outpatient follow up with Pulm, possibly COPD-component     12. Hyperkalemia  - K on admission 3.3  - replete with 40 po K x 1  - Monitor CMPs, replete PRN Chemical VTE ppx contraindicated at this time due to anemia, Xarelto held  - SCDs    11. Chronic exertional dyspnea  - Pt has a hx of chronic shortness of breath  - Former smoker, quit 30 years ago, smoked 1 ppd for 20 years  - Advised outpatient follow up with Pulm, possibly COPD-component   -also noted on CXR to have Basilar linear density slight in degree improved from prior. She was instructed to f/u with PCP as outpatient to ensure resolution.     12. Hyperkalemia  - K on admission 3.3  - replete with 40 po K x 1  - Monitor CMPs, replete PRN

## 2021-03-18 NOTE — H&P ADULT - PROBLEM SELECTOR PLAN 9
Chronic, stable  - hx of thoracic aortic dilatation with dimension of 4.1 cm in ascending portion   - outpatient follow up with Dr. Nesbitt

## 2021-03-18 NOTE — H&P ADULT - NSHPPHYSICALEXAM_GEN_ALL_CORE
T(C): 36.7 (03-18-21 @ 12:35), Max: 36.7 (03-18-21 @ 12:35)  HR: 74 (03-18-21 @ 12:35) (74 - 78)  BP: 99/57 (03-18-21 @ 12:35) (99/57 - 106/75)  RR: 16 (03-18-21 @ 12:35) (16 - 16)  SpO2: 98% (03-18-21 @ 12:35) (94% - 98%)    GENERAL: patient appears well, no acute distress, appropriate, pleasant  EYES: sclera clear, no exudates  ENMT: oropharynx clear without erythema, no exudates, moist mucous membranes  NECK: supple, soft, no thyromegaly noted  LUNGS: good air entry bilaterally, clear to auscultation, symmetric breath sounds, no wheezing or rhonchi appreciated  HEART: soft S1/S2, irregular regular rhythm, no murmurs noted, no lower extremity edema  GASTROINTESTINAL: abdomen is soft, nontender, nondistended, normoactive bowel sounds, no palpable masses  INTEGUMENT: good skin turgor, no lesions noted  MUSCULOSKELETAL: no clubbing or cyanosis, no obvious deformity  NEUROLOGIC: awake, alert, oriented x3, good muscle tone in 4 extremities, no obvious sensory deficits  PSYCHIATRIC: mood is good, affect is congruent, linear and logical thought process  HEME/LYMPH:  no obvious ecchymosis or petechiae T(C): 36.7 (03-18-21 @ 12:35), Max: 36.7 (03-18-21 @ 12:35)  HR: 74 (03-18-21 @ 12:35) (74 - 78)  BP: 99/57 (03-18-21 @ 12:35) (99/57 - 106/75)  RR: 16 (03-18-21 @ 12:35) (16 - 16)  SpO2: 98% (03-18-21 @ 12:35) (94% - 98%)    GENERAL: patient appears well, no acute distress, appropriate, pleasant  EYES: sclera clear, no exudates  ENMT: oropharynx clear without erythema, no exudates, moist mucous membranes  NECK: supple, soft, no thyromegaly noted  LUNGS: decreased breath sounds, no wheezing or rhonchi appreciated  HEART: soft S1/S2, irregular regular rhythm, no murmurs noted, no lower extremity edema  GASTROINTESTINAL: abdomen is soft, nontender, nondistended, normoactive bowel sounds, no palpable masses  INTEGUMENT: good skin turgor, no lesions noted  MUSCULOSKELETAL: no clubbing or cyanosis, no obvious deformity  NEUROLOGIC: awake, alert, oriented x3, good muscle tone in 4 extremities, no obvious sensory deficits  PSYCHIATRIC: mood is good, affect is congruent, linear and logical thought process  HEME/LYMPH:  no obvious ecchymosis or petechiae T(C): 36.7 (03-18-21 @ 12:35), Max: 36.7 (03-18-21 @ 12:35)  HR: 74 (03-18-21 @ 12:35) (74 - 78)  BP: 99/57 (03-18-21 @ 12:35) (99/57 - 106/75)  RR: 16 (03-18-21 @ 12:35) (16 - 16)  SpO2: 98% (03-18-21 @ 12:35) (94% - 98%)    GENERAL: patient appears well, no acute distress, appropriate, pleasant  EYES: sclera clear, no exudates  ENMT: oropharynx clear without erythema, no exudates, moist mucous membranes  NECK: supple, soft, no thyromegaly noted  LUNGS: decreased breath sounds, no wheezing or rhonchi appreciated  HEART: soft S1/S2, irregular regular rhythm, no murmurs noted, trace LE pitting edema b/l  GASTROINTESTINAL: abdomen is soft, nontender, nondistended, normoactive bowel sounds, no palpable masses  INTEGUMENT: good skin turgor, no lesions noted  MUSCULOSKELETAL: no clubbing or cyanosis, no obvious deformity  NEUROLOGIC: awake, alert, oriented x3, good muscle tone in 4 extremities, no obvious sensory deficits, CN 2-12 intact, speech fluent  PSYCHIATRIC: mood is good, affect is congruent, linear and logical thought process  HEME/LYMPH:  no obvious ecchymosis or petechiae

## 2021-03-18 NOTE — H&P ADULT - HISTORY OF PRESENT ILLNESS
Pt is a 76 yo F with a PMHx of HTN, HLD, afib on xarelto s/p ppm in 10/20, hiatal hernia s/p repair in 7/20, chronic iron deficiency anemia, CKD, thoracic aortic aneurysm who presents with lightheadedness. Pt says on monday when standing from a seated position she started to feel lightheadedness and like she was going to pass out. After experiencing this, she took an iron tablet for which she previously stopped taking against medical advice from constipation. Of note, pt prescribed iron tablets for chronic iron deficiency anemia of unknown source. Her hiatal hernia was discovered incidentally after upper and lower EGD which was performed for anemia workup and was repaired in 7/2020. She says her symptoms persisted throughout the week which prompted her to follow up with her cardiologist today Dr. Nesbitt. In the office she was pale with orthostatic hypotension and was told to come to the ed for admission. She denies lightheadedness at rest but says she feels like she will pass out when standing. She denies dizziness, headaches, chest pain, difficulty breathing, abdominal pain, n/v/d/c, melena or hematochezia. Last colonoscopy 2 years ago per pt and was "normal".     In the ED:   VS ; Pt is a 74 yo F with a PMHx of HTN, HLD, afib on xarelto s/p ppm in 10/20, hiatal hernia s/p repair in 7/20, chronic iron deficiency anemia, CKD, thoracic aortic aneurysm who presents with lightheadedness. Pt says on monday when standing from a seated position she started to feel lightheadedness and like she was going to pass out. After experiencing this, she took an iron tablet for which she previously stopped taking against medical advice from constipation. Of note, pt prescribed iron tablets for chronic iron deficiency anemia of unknown source. Her hiatal hernia was discovered incidentally after upper and lower EGD which was performed for anemia workup and was repaired in 7/2020. She says her symptoms persisted throughout the week which prompted her to follow up with her cardiologist today Dr. Nesbitt. In the office she was pale with orthostatic hypotension and was told to come to the ed for admission. She denies lightheadedness at rest but says she feels like she will pass out when standing. She denies dizziness, headaches, chest pain, difficulty breathing, abdominal pain, n/v/d/c, melena or hematochezia. Last colonoscopy 2 years ago per pt and was "normal".     In the ED:   VS: BP 99/57, R 16, T 98, Spo2 98 Hr 74  Labs signifcant H/H 7.8/26.0 wbc 7.80, K 3.3, BUN/Cr: 31/1.20, BNP 1084, trops negative x 1  CXR   IMPRESSION: Basilar linear density slight in degree improved from prior.  EKG afib, no acute signs of ischemia     s/p 1 unit IV NS, 1 unit PRBC  Pt is a 74 yo F with a PMHx of HTN, HLD, afib on xarelto s/p ppm in 10/20, hiatal hernia s/p repair in 7/20, chronic iron deficiency anemia, CKD, thoracic aortic aneurysm who presents with lightheadedness. Pt says on monday when standing from a seated position she started to feel lightheadedness and like she was going to pass out. After experiencing this, she took an iron tablet for which she previously stopped taking against medical advice from constipation. Of note, pt prescribed iron tablets for chronic iron deficiency anemia of unknown source. Her hiatal hernia was discovered incidentally after upper and lower EGD which was performed for anemia workup and was repaired in 7/2020. She says her symptoms persisted throughout the week which prompted her to follow up with her cardiologist today Dr. eNsbitt. In the office she was pale with orthostatic hypotension and was told to come to the ed for admission. She denies lightheadedness at rest but says she feels like she will pass out when standing. She denies dizziness, headaches, chest pain, difficulty breathing, abdominal pain, n/v/d/c, melena or hematochezia. Last colonoscopy 2 years ago per pt and was "normal". Of note pt says she lost considerable amount of weight aince her hiatal hernia repair 2/2 to decreased appetite/loss of desire to eat red meat.    In the ED:   VS: BP 99/57, R 16, T 98, Spo2 98 Hr 74  Labs signifcant H/H 7.8/26.0 wbc 7.80, K 3.3, BUN/Cr: 31/1.20, BNP 1084, trops negative x 1  CXR   IMPRESSION: Basilar linear density slight in degree improved from prior.  EKG afib, no acute signs of ischemia     s/p 1 unit IV NS, 1 unit PRBC  Pt is a 74 yo F with a PMHx of HTN, HLD, afib on xarelto s/p ppm in 10/20, hiatal hernia s/p repair in 7/20, chronic iron deficiency anemia, CAD no stents, CKD, thoracic aortic aneurysm who presents with lightheadedness. Pt says on Monday when standing from a seated position she started to feel lightheadedness and like she was going to pass out. After experiencing this, she took an iron tablet for which she previously stopped taking against medical advice from constipation. Of note, pt prescribed iron tablets for chronic iron deficiency anemia of unknown source. Her hiatal hernia was discovered incidentally after upper and lower EGD which was performed for anemia workup and was repaired in 7/2020. She says her symptoms persisted throughout the week which prompted her to follow up with her cardiologist today Dr. Nesbitt. In the office she was pale with orthostatic hypotension and was told to come to the ed for admission. She denies lightheadedness at rest but says she feels like she will pass out when standing. She denies dizziness, headaches, chest pain, difficulty breathing, abdominal pain, n/v/d/c, melena or hematochezia. Last colonoscopy 2 years ago per pt and was "normal". Of note pt says she lost considerable amount of weight aince her hiatal hernia repair 2/2 to decreased appetite/loss of desire to eat red meat.    In the ED:   VS: BP 99/57, R 16, T 98, Spo2 98 Hr 74  Labs signifcant H/H 7.8/26.0 wbc 7.80, K 3.3, BUN/Cr: 31/1.20, BNP 1084, trops negative x 1  CXR   IMPRESSION: Basilar linear density slight in degree improved from prior.  EKG afib, no acute signs of ischemia     s/p 1 unit IV NS, 1 unit PRBC  Pt is a 76 yo F with a PMHx of HTN, HLD, afib on xarelto s/p ppm in 10/20, hiatal hernia s/p repair in 7/20, chronic iron deficiency anemia, CAD no stents, CKD, thoracic aortic aneurysm who presents with lightheadedness. Pt says on Monday when standing from a seated position she started to feel lightheadedness and like she was going to pass out. After experiencing this, she took an iron tablet for which she previously stopped taking due to constipation. Of note, pt prescribed iron tablets for chronic iron deficiency anemia of unknown source. Her hiatal hernia was discovered incidentally after upper and lower EGD which was performed for anemia workup and was repaired in 7/2020. She says her symptoms persisted throughout the week which prompted her to follow up with her cardiologist today Dr. Nesbitt. In the office she was pale with orthostatic hypotension which was documented in the office today and was told to come to the ed for admission. She denies lightheadedness at rest but says she feels like she will pass out when standing. She denies dizziness, headaches, chest pain, difficulty breathing, abdominal pain, n/v/d/c, melena or hematochezia. Last colonoscopy 1 years ago per pt and was "normal", done by Dr Ya. Of note pt says she lost considerable amount of weight since her hiatal hernia repair 2/2 to decreased appetite/loss of desire to eat red meat.    In the ED:   VS: BP 99/57, R 16, T 98, Spo2 98 Hr 74  Labs signifcant H/H 7.8/26.0 wbc 7.80, K 3.3, BUN/Cr: 31/1.20, BNP 1084, trops negative x 1  CXR   IMPRESSION: Basilar linear density slight in degree improved from prior.  EKG afib, no acute signs of ischemia     s/p 1 unit IV NS, 1 unit PRBC

## 2021-03-18 NOTE — ED ADULT NURSE REASSESSMENT NOTE - NS ED NURSE REASSESS COMMENT FT1
1 st unit of blood transfusion started as ordered. Patient signed the consent. Blood double  checked with second nurse Zayda

## 2021-03-18 NOTE — ED ADULT NURSE NOTE - NSIMPLEMENTINTERV_GEN_ALL_ED
Implemented All Fall with Harm Risk Interventions:  [place] to call system. Call bell, personal items and telephone within reach. Instruct patient to call for assistance. Room bathroom lighting operational. Non-slip footwear when patient is off stretcher. Physically safe environment: no spills, clutter or unnecessary equipment. Stretcher in lowest position, wheels locked, appropriate side rails in place. Provide visual cue, wrist band, yellow gown, etc. Monitor gait and stability. Monitor for mental status changes and reorient to person, place, and time. Review medications for side effects contributing to fall risk. Reinforce activity limits and safety measures with patient and family. Provide visual clues: red socks.

## 2021-03-18 NOTE — H&P ADULT - PROBLEM SELECTOR PLAN 6
Chronic, stable   - s/p repair in 7/20 with thoracic surgeon Dr. Larios  - Caitie WILKINS protonix BID Chronic, stable  - Continue Statin

## 2021-03-19 LAB
ALBUMIN SERPL ELPH-MCNC: 3 G/DL — LOW (ref 3.3–5)
ALP SERPL-CCNC: 77 U/L — SIGNIFICANT CHANGE UP (ref 40–120)
ALT FLD-CCNC: 12 U/L — SIGNIFICANT CHANGE UP (ref 12–78)
ANION GAP SERPL CALC-SCNC: 6 MMOL/L — SIGNIFICANT CHANGE UP (ref 5–17)
APTT BLD: 33.6 SEC — SIGNIFICANT CHANGE UP (ref 27.5–35.5)
AST SERPL-CCNC: 12 U/L — LOW (ref 15–37)
BASOPHILS # BLD AUTO: 0.03 K/UL — SIGNIFICANT CHANGE UP (ref 0–0.2)
BASOPHILS NFR BLD AUTO: 0.6 % — SIGNIFICANT CHANGE UP (ref 0–2)
BILIRUB SERPL-MCNC: 0.5 MG/DL — SIGNIFICANT CHANGE UP (ref 0.2–1.2)
BUN SERPL-MCNC: 21 MG/DL — SIGNIFICANT CHANGE UP (ref 7–23)
CALCIUM SERPL-MCNC: 8.8 MG/DL — SIGNIFICANT CHANGE UP (ref 8.5–10.1)
CHLORIDE SERPL-SCNC: 108 MMOL/L — SIGNIFICANT CHANGE UP (ref 96–108)
CO2 SERPL-SCNC: 27 MMOL/L — SIGNIFICANT CHANGE UP (ref 22–31)
CREAT SERPL-MCNC: 0.92 MG/DL — SIGNIFICANT CHANGE UP (ref 0.5–1.3)
CULTURE RESULTS: SIGNIFICANT CHANGE UP
EOSINOPHIL # BLD AUTO: 0.09 K/UL — SIGNIFICANT CHANGE UP (ref 0–0.5)
EOSINOPHIL NFR BLD AUTO: 1.8 % — SIGNIFICANT CHANGE UP (ref 0–6)
FERRITIN SERPL-MCNC: 95 NG/ML — SIGNIFICANT CHANGE UP (ref 15–150)
GLUCOSE SERPL-MCNC: 96 MG/DL — SIGNIFICANT CHANGE UP (ref 70–99)
HCT VFR BLD CALC: 25.6 % — LOW (ref 34.5–45)
HCT VFR BLD CALC: 26.5 % — LOW (ref 34.5–45)
HGB BLD-MCNC: 8.1 G/DL — LOW (ref 11.5–15.5)
HGB BLD-MCNC: 8.3 G/DL — LOW (ref 11.5–15.5)
IMM GRANULOCYTES NFR BLD AUTO: 0.8 % — SIGNIFICANT CHANGE UP (ref 0–1.5)
INR BLD: 2.19 RATIO — HIGH (ref 0.88–1.16)
IRON SATN MFR SERPL: 10 % — LOW (ref 14–50)
IRON SATN MFR SERPL: 37 UG/DL — SIGNIFICANT CHANGE UP (ref 30–160)
LYMPHOCYTES # BLD AUTO: 1.19 K/UL — SIGNIFICANT CHANGE UP (ref 1–3.3)
LYMPHOCYTES # BLD AUTO: 23.2 % — SIGNIFICANT CHANGE UP (ref 13–44)
MAGNESIUM SERPL-MCNC: 2.1 MG/DL — SIGNIFICANT CHANGE UP (ref 1.6–2.6)
MCHC RBC-ENTMCNC: 28.2 PG — SIGNIFICANT CHANGE UP (ref 27–34)
MCHC RBC-ENTMCNC: 31.6 GM/DL — LOW (ref 32–36)
MCV RBC AUTO: 89.2 FL — SIGNIFICANT CHANGE UP (ref 80–100)
MONOCYTES # BLD AUTO: 0.45 K/UL — SIGNIFICANT CHANGE UP (ref 0–0.9)
MONOCYTES NFR BLD AUTO: 8.8 % — SIGNIFICANT CHANGE UP (ref 2–14)
NEUTROPHILS # BLD AUTO: 3.33 K/UL — SIGNIFICANT CHANGE UP (ref 1.8–7.4)
NEUTROPHILS NFR BLD AUTO: 64.8 % — SIGNIFICANT CHANGE UP (ref 43–77)
NRBC # BLD: 0 /100 WBCS — SIGNIFICANT CHANGE UP (ref 0–0)
OB PNL STL: POSITIVE
PHOSPHATE SERPL-MCNC: 2.6 MG/DL — SIGNIFICANT CHANGE UP (ref 2.5–4.5)
PLATELET # BLD AUTO: 269 K/UL — SIGNIFICANT CHANGE UP (ref 150–400)
POTASSIUM SERPL-MCNC: 3.9 MMOL/L — SIGNIFICANT CHANGE UP (ref 3.5–5.3)
POTASSIUM SERPL-SCNC: 3.9 MMOL/L — SIGNIFICANT CHANGE UP (ref 3.5–5.3)
PROT SERPL-MCNC: 6.1 G/DL — SIGNIFICANT CHANGE UP (ref 6–8.3)
PROTHROM AB SERPL-ACNC: 24.7 SEC — HIGH (ref 10.6–13.6)
RBC # BLD: 2.87 M/UL — LOW (ref 3.8–5.2)
RBC # FLD: 20.4 % — HIGH (ref 10.3–14.5)
SODIUM SERPL-SCNC: 141 MMOL/L — SIGNIFICANT CHANGE UP (ref 135–145)
SPECIMEN SOURCE: SIGNIFICANT CHANGE UP
TIBC SERPL-MCNC: 372 UG/DL — SIGNIFICANT CHANGE UP (ref 220–430)
UIBC SERPL-MCNC: 335 UG/DL — SIGNIFICANT CHANGE UP (ref 110–370)
VIT B12 SERPL-MCNC: 278 PG/ML — SIGNIFICANT CHANGE UP (ref 232–1245)
WBC # BLD: 5.13 K/UL — SIGNIFICANT CHANGE UP (ref 3.8–10.5)
WBC # FLD AUTO: 5.13 K/UL — SIGNIFICANT CHANGE UP (ref 3.8–10.5)

## 2021-03-19 PROCEDURE — 99233 SBSQ HOSP IP/OBS HIGH 50: CPT | Mod: GC

## 2021-03-19 RX ORDER — METOPROLOL TARTRATE 50 MG
25 TABLET ORAL DAILY
Refills: 0 | Status: DISCONTINUED | OUTPATIENT
Start: 2021-03-19 | End: 2021-03-21

## 2021-03-19 RX ORDER — DILTIAZEM HCL 120 MG
30 CAPSULE, EXT RELEASE 24 HR ORAL EVERY 6 HOURS
Refills: 0 | Status: DISCONTINUED | OUTPATIENT
Start: 2021-03-19 | End: 2021-03-21

## 2021-03-19 RX ORDER — ACETAMINOPHEN 500 MG
650 TABLET ORAL ONCE
Refills: 0 | Status: COMPLETED | OUTPATIENT
Start: 2021-03-19 | End: 2021-03-19

## 2021-03-19 RX ADMIN — LIDOCAINE 1 PATCH: 4 CREAM TOPICAL at 07:32

## 2021-03-19 RX ADMIN — Medication 25 MILLIGRAM(S): at 17:02

## 2021-03-19 RX ADMIN — Medication 30 MILLIGRAM(S): at 21:28

## 2021-03-19 RX ADMIN — Medication 325 MILLIGRAM(S): at 12:41

## 2021-03-19 RX ADMIN — Medication 5 MILLIGRAM(S): at 21:28

## 2021-03-19 RX ADMIN — LIDOCAINE 1 PATCH: 4 CREAM TOPICAL at 12:39

## 2021-03-19 RX ADMIN — PANTOPRAZOLE SODIUM 40 MILLIGRAM(S): 20 TABLET, DELAYED RELEASE ORAL at 17:03

## 2021-03-19 RX ADMIN — Medication 650 MILLIGRAM(S): at 21:28

## 2021-03-19 RX ADMIN — Medication 650 MILLIGRAM(S): at 22:28

## 2021-03-19 RX ADMIN — ATORVASTATIN CALCIUM 10 MILLIGRAM(S): 80 TABLET, FILM COATED ORAL at 21:28

## 2021-03-19 RX ADMIN — PANTOPRAZOLE SODIUM 40 MILLIGRAM(S): 20 TABLET, DELAYED RELEASE ORAL at 07:03

## 2021-03-19 NOTE — PROGRESS NOTE ADULT - PROBLEM SELECTOR PLAN 2
Pt presented with 4 day hx of pre-syncope with positive orthostatics in outpatient cardiologist office  - Likely 2/2 to anemia   - Plan as above  - Check orthostatics  - Cardio consulted (Dr. Britt) - TTE f/u, hold anti-hypertensives for now  - Remote tele and hemodynamic monitoring Pt presented with 4 day hx of pre-syncope with positive orthostatics in outpatient cardiologist office  - Likely 2/2 to anemia   - Plan as above  - orthostatics negative this AM, continue to heck orthostatics daily  - Cardio consulted (Dr. Britt) - TTE f/u, hold anti-hypertensives for now  - Remote tele and hemodynamic monitoring Pt presented with 4 day hx of pre-syncope with positive orthostatics in outpatient cardiologist office  - Likely due to symptomatic anemia   - Plan as above  - orthostatics negative this AM, continue to check orthostatics daily  - Cardio consulted (Dr. Britt) - TTE f/u, hold anti-hypertensives for now  - Remote tele and hemodynamic monitoring

## 2021-03-19 NOTE — PROGRESS NOTE ADULT - SUBJECTIVE AND OBJECTIVE BOX
Patient is a 75y old  Female who presents with a chief complaint of Presyncope, anemia (18 Mar 2021 14:08)      INTERVAL HPI/OVERNIGHT EVENTS: No acute events overnight. Pt seen and examined at bedside. Pt states she feels much better. Stood up and walked to the bathroom earlier without any vertigo or lightheadedness. Pt is asking if she can go home. Pt states she has good appetite. Denies lightheaded vertigo, ever, chills, CP, palpitations, abdominal pain, constipation, diarrhea, hematemesis hematuria, hematochezia melena.       MEDICATIONS  (STANDING):  atorvastatin 10 milliGRAM(s) Oral at bedtime  ferrous    sulfate 325 milliGRAM(s) Oral daily  melatonin 5 milliGRAM(s) Oral at bedtime  pantoprazole  Injectable 40 milliGRAM(s) IV Push two times a day    MEDICATIONS  (PRN):      Allergies    doxycycline (Unknown)    Intolerances        REVIEW OF SYSTEMS:  CONSTITUTIONAL: No fever or chills  HEENT:  No lightheadedness, headache, no sore throat  RESPIRATORY: No cough, wheezing, or shortness of breath  CARDIOVASCULAR: No chest pain, palpitations  GASTROINTESTINAL: No abd pain, nausea, vomiting, or diarrhea  GENITOURINARY: No dysuria, frequency, or hematuria  NEUROLOGICAL: no vertigo, focal weakness or dizziness  MUSCULOSKELETAL: no myalgias     Vital Signs Last 24 Hrs  T(C): 36.7 (19 Mar 2021 04:55), Max: 36.7 (18 Mar 2021 12:35)  T(F): 98.1 (19 Mar 2021 04:55), Max: 98.1 (18 Mar 2021 18:25)  HR: 88 (19 Mar 2021 04:55) (59 - 88)  BP: 104/68 (19 Mar 2021 04:55) (90/59 - 108/60)  BP(mean): --  RR: 16 (19 Mar 2021 04:55) (15 - 16)  SpO2: 92% (19 Mar 2021 04:55) (92% - 100%)    PHYSICAL EXAM:  GENERAL: NAD, sitting upright eating breakfast  HEENT:  anicteric, moist and non pale mucous membranes  CHEST/LUNG:  CTA b/l, no rales, wheezes, or rhonchi  HEART:  soft S1/S2, irregular regular rhythm, no murmurs noted, trace LE pitting edema b/l R>L  ABDOMEN:  BS+, soft, nontender, nondistended  EXTREMITIES: trace LE pitting edema b/l R>L, cyanosis, or calf tenderness  NERVOUS SYSTEM: answers questions and follows commands appropriately  SKIN; appears normal skin color, not pale appearing     LABS:                        8.1    5.13  )-----------( 269      ( 19 Mar 2021 07:16 )             25.6     CBC Full  -  ( 19 Mar 2021 07:16 )  WBC Count : 5.13 K/uL  Hemoglobin : 8.1 g/dL  Hematocrit : 25.6 %  Platelet Count - Automated : 269 K/uL  Mean Cell Volume : 89.2 fl  Mean Cell Hemoglobin : 28.2 pg  Mean Cell Hemoglobin Concentration : 31.6 gm/dL  Auto Neutrophil # : 3.33 K/uL  Auto Lymphocyte # : 1.19 K/uL  Auto Monocyte # : 0.45 K/uL  Auto Eosinophil # : 0.09 K/uL  Auto Basophil # : 0.03 K/uL  Auto Neutrophil % : 64.8 %  Auto Lymphocyte % : 23.2 %  Auto Monocyte % : 8.8 %  Auto Eosinophil % : 1.8 %  Auto Basophil % : 0.6 %    19 Mar 2021 07:16    141    |  108    |  21     ----------------------------<  96     3.9     |  27     |  0.92     Ca    8.8        19 Mar 2021 07:16  Phos  2.6       19 Mar 2021 07:16  Mg     2.1       19 Mar 2021 07:16    TPro  6.1    /  Alb  3.0    /  TBili  0.5    /  DBili  x      /  AST  12     /  ALT  12     /  AlkPhos  77     19 Mar 2021 07:16    PT/INR - ( 19 Mar 2021 07:16 )   PT: 24.7 sec;   INR: 2.19 ratio         PTT - ( 19 Mar 2021 07:16 )  PTT:33.6 sec  Urinalysis Basic - ( 18 Mar 2021 16:32 )    Color: Yellow / Appearance: Slightly Turbid / S.025 / pH: x  Gluc: x / Ketone: Trace  / Bili: Negative / Urobili: Negative   Blood: x / Protein: Negative / Nitrite: Negative   Leuk Esterase: Negative / RBC: x / WBC 0-2   Sq Epi: x / Non Sq Epi: Moderate / Bacteria: Occasional      CAPILLARY BLOOD GLUCOSE              RADIOLOGY & ADDITIONAL TESTS:    Personally reviewed.     Consultant(s) Notes Reviewed:  [x] YES  [ ] NO     Patient is a 75y old  Female who presents with a chief complaint of presyncope.      INTERVAL HPI/OVERNIGHT EVENTS: No acute events overnight. Pt seen and examined at bedside. Pt states she feels better. Stood up and walked to the bathroom earlier without any lightheadedness. Pt states she has good appetite. Admits very dark stool. Denies lightheadedness, vertigo, fever, chills, CP, palpitations, abdominal pain, constipation, diarrhea, hematemesis hematuria, hematochezia.       MEDICATIONS  (STANDING):  atorvastatin 10 milliGRAM(s) Oral at bedtime  ferrous    sulfate 325 milliGRAM(s) Oral daily  melatonin 5 milliGRAM(s) Oral at bedtime  pantoprazole  Injectable 40 milliGRAM(s) IV Push two times a day    MEDICATIONS  (PRN):      Allergies    doxycycline (Unknown)    Intolerances        REVIEW OF SYSTEMS:  CONSTITUTIONAL: No fever or chills  HEENT:  No headache, no sore throat  RESPIRATORY: No cough, wheezing, or shortness of breath  CARDIOVASCULAR: No chest pain, palpitations  GASTROINTESTINAL: +dark stool, no hematochezia; No abd pain, nausea, vomiting, or diarrhea  GENITOURINARY: No dysuria, frequency, or hematuria  NEUROLOGICAL: no lightheadedness, vertigo, focal weakness   MUSCULOSKELETAL: no myalgias     Vital Signs Last 24 Hrs  T(C): 36.7 (19 Mar 2021 04:55), Max: 36.7 (18 Mar 2021 12:35)  T(F): 98.1 (19 Mar 2021 04:55), Max: 98.1 (18 Mar 2021 18:25)  HR: 88 (19 Mar 2021 04:55) (59 - 88)  BP: 104/68 (19 Mar 2021 04:55) (90/59 - 108/60)  BP(mean): --  RR: 16 (19 Mar 2021 04:55) (15 - 16)  SpO2: 92% (19 Mar 2021 04:55) (92% - 100%)    PHYSICAL EXAM:  GENERAL: NAD, sitting upright eating breakfast  HEENT:  anicteric, moist mucous membranes  CHEST/LUNG:  CTA b/l, no rales, wheezes, or rhonchi  HEART:  S1, S2, irregular rhythm  ABDOMEN:  BS+, soft, nontender, nondistended  EXTREMITIES: trace LE pitting edema b/l R>L, no cyanosis or calf tenderness  NERVOUS SYSTEM: answers questions and follows commands appropriately    LABS:                        8.1    5.13  )-----------( 269      ( 19 Mar 2021 07:16 )             25.6     CBC Full  -  ( 19 Mar 2021 07:16 )  WBC Count : 5.13 K/uL  Hemoglobin : 8.1 g/dL  Hematocrit : 25.6 %  Platelet Count - Automated : 269 K/uL  Mean Cell Volume : 89.2 fl  Mean Cell Hemoglobin : 28.2 pg  Mean Cell Hemoglobin Concentration : 31.6 gm/dL  Auto Neutrophil # : 3.33 K/uL  Auto Lymphocyte # : 1.19 K/uL  Auto Monocyte # : 0.45 K/uL  Auto Eosinophil # : 0.09 K/uL  Auto Basophil # : 0.03 K/uL  Auto Neutrophil % : 64.8 %  Auto Lymphocyte % : 23.2 %  Auto Monocyte % : 8.8 %  Auto Eosinophil % : 1.8 %  Auto Basophil % : 0.6 %    19 Mar 2021 07:16    141    |  108    |  21     ----------------------------<  96     3.9     |  27     |  0.92     Ca    8.8        19 Mar 2021 07:16  Phos  2.6       19 Mar 2021 07:16  Mg     2.1       19 Mar 2021 07:16    TPro  6.1    /  Alb  3.0    /  TBili  0.5    /  DBili  x      /  AST  12     /  ALT  12     /  AlkPhos  77     19 Mar 2021 07:16    PT/INR - ( 19 Mar 2021 07:16 )   PT: 24.7 sec;   INR: 2.19 ratio         PTT - ( 19 Mar 2021 07:16 )  PTT:33.6 sec  Urinalysis Basic - ( 18 Mar 2021 16:32 )    Color: Yellow / Appearance: Slightly Turbid / S.025 / pH: x  Gluc: x / Ketone: Trace  / Bili: Negative / Urobili: Negative   Blood: x / Protein: Negative / Nitrite: Negative   Leuk Esterase: Negative / RBC: x / WBC 0-2   Sq Epi: x / Non Sq Epi: Moderate / Bacteria: Occasional      CAPILLARY BLOOD GLUCOSE              RADIOLOGY & ADDITIONAL TESTS:    Personally reviewed.     Consultant(s) Notes Reviewed:  [x] YES  [ ] NO

## 2021-03-19 NOTE — PROGRESS NOTE ADULT - PROBLEM SELECTOR PLAN 1
symptomatic anemia after not taking iron tabs due to constipation, hx of chronic iron deficiency of anemia of unknown etiology after evaluation with upper and lower EGD by Dr. Howell   -Hgb 7.8 to 8.1 s/p 1 U pRBCs and 1 U IV NS, repeat H/H at 2pm   - No signs of active bleeding, continue to monitor for   - Continue ferrous sulfate  - IV protonix 40 mg BID  - Check FOBT f/u   - Hold Xarelto  - Monitor CBCs, transfuse for Hgb <8  - GI consult (Dr. Hooker), f/u recs -symptomatic anemia: r/o UGIB, may also have component of iron deficiency anemia not responsive to oral iron pills. Pt has hx of of chronic iron deficiency of anemia of unknown etiology after evaluation with upper and lower EGD by Dr. Howell.  -asymptomatic now after 1 U pRBCS  -Hgb 7.8 to 8.1 s/p 1 U pRBCs and 1 U IV NS, repeat H/H at 2pm   - No signs of active bleeding, continue to monitor for bleeding. Pt states her stools are usually very black, melena due to taking oral iron. It is therefore unclear if bleeding is being masked.    - Continue ferrous sulfate  - IV protonix 40 mg BID  -FOBT +   - Hold Xarelto  -will attempt to get records from cardiology of latest blood work, will attempt to contact Dr. Howell   - Monitor CBCs, transfuse for Hgb <8  - GI consult (Dr. Hooker), f/u recs -symptomatic anemia: r/o UGIB, may also have component of iron deficiency anemia not responsive to oral iron pills. Pt has hx of chronic iron deficiency of anemia of unknown etiology after evaluation with EGD by Dr. Ya a year ago. Pt had a large hiatal hernia, which was surgically repaired in 07/2020  -symptoms improved now after 1un pRBCS  -Hgb 7.8 to 8.1 s/p 1 U pRBCs and 1 U IV NS, repeat H/H at 2pm   - No signs of gross active bleeding, continue to monitor for bleeding. Pt states her stools are usually very dark due to taking oral iron and are also soft/loose due to taking stool softeners, thus making it difficult to distinguish from melena. It is therefore unclear if bleeding is being masked.    - Continue ferrous sulfate  - c/w protonix 40mg IV BID for now  - FOBT + ; asked RN and pt to catch next bowel movement for a physician to evaluate to perhaps give better idea whether it is apparent melena  - Hold Xarelto for now  - will attempt to get outpt records of bloodwork to gauge drop in H&H  - Monitor CBCs, transfuse for Hgb <8  - GI consult (Dr. Hooker), f/u recs

## 2021-03-19 NOTE — PROGRESS NOTE ADULT - PROBLEM SELECTOR PLAN 9
Chronic, stable  - hx of thoracic aortic dilatation with dimension of 4.1 cm in ascending portion   - outpatient follow up with Dr. Nesbitt - hx of thoracic aortic dilatation with dimension of 4.1 cm in ascending portion   - outpatient follow up with Dr. Nesbitt

## 2021-03-19 NOTE — PROGRESS NOTE ADULT - PROBLEM SELECTOR PLAN 3
Chronic, stable, s/p ppm 10/20  - Hold home Xarelto, Diltiazem, Metoprolol, and Valsartan-Hctz due to anemia and hypotension  - Monitor on remote tele  - Cardio consulted Dr. Britt f/u reccs Chronic afib   - Held home Xarelto, Diltiazem, Metoprolol due to anemia with positive FOBT and hypotension  - Monitor on remote tele  - Cardio consulted Dr. Britt f/u [facility]  - in the afternoon pt went into afib with RVR; discussed with cardio, given pt's BP had improved from 90s/60s up to SBP in 120s, restarted pt's Toprol XL 25mg po daily; later in the evening also started cardizem 30mg po q6h with hold parameters (will uptitrate cardizem as needed and if tolerated by BP -- home dose of 360mg daily)

## 2021-03-19 NOTE — PROGRESS NOTE ADULT - PROBLEM SELECTOR PLAN 8
GFR improved today, 44 on admission, baseline per prior hospital visits ~35-50  - Trend renal indices  - avoid nephrotoxic agents GFR improved today, 44 on admission, baseline per prior hospital visits ~35-50 (CKD Stage 3)  - Trend renal indices  - avoid nephrotoxic agents

## 2021-03-19 NOTE — PROGRESS NOTE ADULT - PROBLEM SELECTOR PLAN 4
Hx of htn on Diltiazem, Metoprolol, and Valsartan-Hctz   - Hypotensive on admission with SBP 99, improved to 104/68  - Hold anti-hypertensives for now   - Monitor hemodynamics Hx of HTN on Diltiazem, Metoprolol, and Valsartan-Hctz   - Hypotensive on admission with BP in 90s/60s and pt symptomatic, improved to 104/68  - Held anti-hypertensives initially  - in the afternoon pt went into afib with RVR; discussed with cardio, given pt's BP had improved from 90s/60s up to SBP in 120s, restarted pt's Toprol XL 25mg po daily; later in the evening also started cardizem 30mg po q6h with hold parameters (will uptitrate cardizem as needed and if tolerated by BP -- home dose of 360mg daily)

## 2021-03-19 NOTE — PROGRESS NOTE ADULT - PROBLEM SELECTOR PLAN 10
Chemical VTE ppx contraindicated at this time due to anemia, Xarelto held  - SCDs    11. Chronic exertional dyspnea  - Pt has a hx of chronic shortness of breath  - Former smoker, quit 30 years ago, smoked 1 ppd for 20 years  - Advised outpatient follow up with Pulm, possibly COPD-component   -also noted on CXR to have Basilar linear density slight in degree improved from prior. She was instructed to f/u with PCP as outpatient to ensure resolution.     12. Hyperkalemia  - K on admission 3.3  - replete with 40 po K x 1  - Monitor CMPs, replete PRN Chemical VTE ppx contraindicated at this time due to anemia, positive FOBT, GIB w/up in progress, Xarelto held  - SCDs    11. Chronic exertional dyspnea  - Pt has a reported hx of chronic HSU  - Former smoker, quit 30 years ago, smoked 1 ppd for 20 years  - Advised outpatient follow up with Pulm and cardio  -also noted on CXR to have Basilar linear density slight in degree improved from prior. She was instructed to f/u with PCP as outpatient to ensure resolution.     12. Hyperkalemia  - K on admission 3.3  - repleted and normalized  - Monitor BMP, replete PRN

## 2021-03-19 NOTE — PROGRESS NOTE ADULT - PROBLEM SELECTOR PLAN 7
Chronic, stable   - s/p repair in 7/20 with thoracic surgeon Dr. Larios  - FRANKY hernandez BID - s/p repair in 7/2020 with thoracic surgeon Dr. Larios  - c/w david

## 2021-03-19 NOTE — PROGRESS NOTE ADULT - ASSESSMENT
Pt is a 74 yo F with a PMHx of HTN, HLD, afib on xarelto s/p ppm in 10/20, hiatal hernia s/p repair in 7/20, chronic iron deficiency anemia, CKD, thoracic aortic aneurysm, CAD, and chronic exertional dyspnea, admit for presyncope likely 2/2 to anemia and orthostatic hypotension 76yo F with a PMHx of HTN, HLD, afib on xarelto, s/p PPM in 10/20, hiatal hernia s/p repair in 07/2020, chronic iron deficiency anemia, CKD, thoracic aortic aneurysm, CAD, and chronic exertional dyspnea, admitted for presyncope likely due to symptomatic anemia and orthostatic hypotension.

## 2021-03-20 ENCOUNTER — TRANSCRIPTION ENCOUNTER (OUTPATIENT)
Age: 76
End: 2021-03-20

## 2021-03-20 LAB
ALBUMIN SERPL ELPH-MCNC: 3 G/DL — LOW (ref 3.3–5)
ALP SERPL-CCNC: 73 U/L — SIGNIFICANT CHANGE UP (ref 40–120)
ALT FLD-CCNC: 11 U/L — LOW (ref 12–78)
ANION GAP SERPL CALC-SCNC: 4 MMOL/L — LOW (ref 5–17)
APTT BLD: 28.7 SEC — SIGNIFICANT CHANGE UP (ref 27.5–35.5)
AST SERPL-CCNC: 11 U/L — LOW (ref 15–37)
BASOPHILS # BLD AUTO: 0.04 K/UL — SIGNIFICANT CHANGE UP (ref 0–0.2)
BASOPHILS NFR BLD AUTO: 0.9 % — SIGNIFICANT CHANGE UP (ref 0–2)
BILIRUB SERPL-MCNC: 0.3 MG/DL — SIGNIFICANT CHANGE UP (ref 0.2–1.2)
BUN SERPL-MCNC: 16 MG/DL — SIGNIFICANT CHANGE UP (ref 7–23)
CALCIUM SERPL-MCNC: 9.6 MG/DL — SIGNIFICANT CHANGE UP (ref 8.5–10.1)
CHLORIDE SERPL-SCNC: 109 MMOL/L — HIGH (ref 96–108)
CO2 SERPL-SCNC: 30 MMOL/L — SIGNIFICANT CHANGE UP (ref 22–31)
CREAT SERPL-MCNC: 0.91 MG/DL — SIGNIFICANT CHANGE UP (ref 0.5–1.3)
EOSINOPHIL # BLD AUTO: 0.13 K/UL — SIGNIFICANT CHANGE UP (ref 0–0.5)
EOSINOPHIL NFR BLD AUTO: 2.8 % — SIGNIFICANT CHANGE UP (ref 0–6)
FOLATE RBC-MCNC: 1189 NG/ML — SIGNIFICANT CHANGE UP (ref 499–1504)
GLUCOSE SERPL-MCNC: 95 MG/DL — SIGNIFICANT CHANGE UP (ref 70–99)
HCT VFR BLD CALC: 26.9 % — LOW (ref 34.5–45)
HCT VFR BLD CALC: 27 % — LOW (ref 34.5–45)
HCT VFR BLD CALC: 32.2 % — LOW (ref 34.5–45)
HGB BLD-MCNC: 10.2 G/DL — LOW (ref 11.5–15.5)
HGB BLD-MCNC: 8.1 G/DL — LOW (ref 11.5–15.5)
IMM GRANULOCYTES NFR BLD AUTO: 0.4 % — SIGNIFICANT CHANGE UP (ref 0–1.5)
INR BLD: 1.16 RATIO — SIGNIFICANT CHANGE UP (ref 0.88–1.16)
LYMPHOCYTES # BLD AUTO: 1.33 K/UL — SIGNIFICANT CHANGE UP (ref 1–3.3)
LYMPHOCYTES # BLD AUTO: 28.5 % — SIGNIFICANT CHANGE UP (ref 13–44)
MCHC RBC-ENTMCNC: 27.8 PG — SIGNIFICANT CHANGE UP (ref 27–34)
MCHC RBC-ENTMCNC: 30.1 GM/DL — LOW (ref 32–36)
MCV RBC AUTO: 92.4 FL — SIGNIFICANT CHANGE UP (ref 80–100)
MONOCYTES # BLD AUTO: 0.4 K/UL — SIGNIFICANT CHANGE UP (ref 0–0.9)
MONOCYTES NFR BLD AUTO: 8.6 % — SIGNIFICANT CHANGE UP (ref 2–14)
NEUTROPHILS # BLD AUTO: 2.75 K/UL — SIGNIFICANT CHANGE UP (ref 1.8–7.4)
NEUTROPHILS NFR BLD AUTO: 58.8 % — SIGNIFICANT CHANGE UP (ref 43–77)
NRBC # BLD: 0 /100 WBCS — SIGNIFICANT CHANGE UP (ref 0–0)
PLATELET # BLD AUTO: 281 K/UL — SIGNIFICANT CHANGE UP (ref 150–400)
POTASSIUM SERPL-MCNC: 4.4 MMOL/L — SIGNIFICANT CHANGE UP (ref 3.5–5.3)
POTASSIUM SERPL-SCNC: 4.4 MMOL/L — SIGNIFICANT CHANGE UP (ref 3.5–5.3)
PROT SERPL-MCNC: 6.2 G/DL — SIGNIFICANT CHANGE UP (ref 6–8.3)
PROTHROM AB SERPL-ACNC: 13.5 SEC — SIGNIFICANT CHANGE UP (ref 10.6–13.6)
RBC # BLD: 2.91 M/UL — LOW (ref 3.8–5.2)
RBC # FLD: 20.4 % — HIGH (ref 10.3–14.5)
SODIUM SERPL-SCNC: 143 MMOL/L — SIGNIFICANT CHANGE UP (ref 135–145)
WBC # BLD: 4.67 K/UL — SIGNIFICANT CHANGE UP (ref 3.8–10.5)
WBC # FLD AUTO: 4.67 K/UL — SIGNIFICANT CHANGE UP (ref 3.8–10.5)

## 2021-03-20 PROCEDURE — 99233 SBSQ HOSP IP/OBS HIGH 50: CPT | Mod: GC

## 2021-03-20 RX ORDER — IRON SUCROSE 20 MG/ML
100 INJECTION, SOLUTION INTRAVENOUS EVERY 24 HOURS
Refills: 0 | Status: DISCONTINUED | OUTPATIENT
Start: 2021-03-20 | End: 2021-03-21

## 2021-03-20 RX ORDER — PREGABALIN 225 MG/1
1000 CAPSULE ORAL DAILY
Refills: 0 | Status: DISCONTINUED | OUTPATIENT
Start: 2021-03-20 | End: 2021-03-21

## 2021-03-20 RX ORDER — PANTOPRAZOLE SODIUM 20 MG/1
40 TABLET, DELAYED RELEASE ORAL
Refills: 0 | Status: DISCONTINUED | OUTPATIENT
Start: 2021-03-21 | End: 2021-03-21

## 2021-03-20 RX ORDER — ACETAMINOPHEN 500 MG
650 TABLET ORAL EVERY 6 HOURS
Refills: 0 | Status: DISCONTINUED | OUTPATIENT
Start: 2021-03-20 | End: 2021-03-21

## 2021-03-20 RX ORDER — RIVAROXABAN 15 MG-20MG
20 KIT ORAL
Refills: 0 | Status: DISCONTINUED | OUTPATIENT
Start: 2021-03-20 | End: 2021-03-21

## 2021-03-20 RX ADMIN — Medication 25 MILLIGRAM(S): at 05:30

## 2021-03-20 RX ADMIN — Medication 5 MILLIGRAM(S): at 21:23

## 2021-03-20 RX ADMIN — PANTOPRAZOLE SODIUM 40 MILLIGRAM(S): 20 TABLET, DELAYED RELEASE ORAL at 17:10

## 2021-03-20 RX ADMIN — Medication 650 MILLIGRAM(S): at 21:24

## 2021-03-20 RX ADMIN — RIVAROXABAN 20 MILLIGRAM(S): KIT at 19:49

## 2021-03-20 RX ADMIN — IRON SUCROSE 100 MILLIGRAM(S): 20 INJECTION, SOLUTION INTRAVENOUS at 18:11

## 2021-03-20 RX ADMIN — Medication 650 MILLIGRAM(S): at 22:20

## 2021-03-20 RX ADMIN — Medication 325 MILLIGRAM(S): at 12:07

## 2021-03-20 RX ADMIN — Medication 30 MILLIGRAM(S): at 12:07

## 2021-03-20 RX ADMIN — Medication 30 MILLIGRAM(S): at 17:10

## 2021-03-20 RX ADMIN — ATORVASTATIN CALCIUM 10 MILLIGRAM(S): 80 TABLET, FILM COATED ORAL at 21:22

## 2021-03-20 RX ADMIN — Medication 30 MILLIGRAM(S): at 05:29

## 2021-03-20 RX ADMIN — Medication 30 MILLIGRAM(S): at 23:52

## 2021-03-20 RX ADMIN — PREGABALIN 1000 MICROGRAM(S): 225 CAPSULE ORAL at 12:07

## 2021-03-20 RX ADMIN — PANTOPRAZOLE SODIUM 40 MILLIGRAM(S): 20 TABLET, DELAYED RELEASE ORAL at 05:30

## 2021-03-20 NOTE — PROGRESS NOTE ADULT - PROBLEM SELECTOR PLAN 3
Chronic afib, rapid a fib with RVR on 3/19   -yesterday afternoon pt went into afib with RVR; given pt's BP had improved from 90s/60s up to SBP in 120s, restarted pt's Toprol XL 25mg po daily; later in the evening yesterday also started cardizem 30mg po q6h with hold parameters (will uptitrate cardizem as needed and if tolerated by BP -- home dose of 360mg daily)  - Hold home Xarelto due to anemia with positive FOBT and hypotension  - Monitor on remote tele  - [facility], rapid a fib with RVR on 3/19   -yesterday afternoon/evening pt was in afib with RVR; given pt's BP had improved from 90s/60s up to SBP in 120s, restarted pt's Toprol XL 25mg po daily; later in the evening yesterday also started cardizem 30mg po q6h with hold parameters (will uptitrate cardizem as needed and if tolerated by BP -- home dose of 360mg daily)  - GI now cleared to restart Xarelto-- will restart this evening and monitor closely for signs of bleeding  - Monitor on remote tele  - Cardio consulted Dr. Britt group, recs appreciated

## 2021-03-20 NOTE — DISCHARGE NOTE PROVIDER - NSDCMRMEDTOKEN_GEN_ALL_CORE_FT
DilTIAZem (Eqv-Cardizem CD) 360 mg/24 hours oral capsule, extended release: 1 cap(s) orally once a day  metoprolol succinate 25 mg oral tablet, extended release: 1 tab(s) orally once a day  pravastatin 40 mg oral tablet: 1 tab(s) orally once a day  rivaroxaban 20 mg oral tablet: 1 tab(s) orally once a day  Slow Fe 160 mg (50 mg elemental iron) oral tablet, extended release: 1 tab(s) orally once a day  valsartan-hydrochlorothiazide 80mg-12.5mg oral tablet: 1 tab(s) orally once a day   cyanocobalamin 1000 mcg oral tablet: 1 tab(s) orally once a day  DilTIAZem (Eqv-Cardizem CD) 360 mg/24 hours oral capsule, extended release: 1 cap(s) orally once a day  metoprolol succinate 25 mg oral tablet, extended release: 1 tab(s) orally once a day  pravastatin 40 mg oral tablet: 1 tab(s) orally once a day  rivaroxaban 20 mg oral tablet: 1 tab(s) orally once a day  Slow Fe 160 mg (50 mg elemental iron) oral tablet, extended release: 1 tab(s) orally once a day   cyanocobalamin 1000 mcg oral tablet: 1 tab(s) orally once a day  Dilt- mg/24 hours oral capsule, extended release: 1 cap(s) orally once a day   metoprolol succinate 25 mg oral tablet, extended release: 1 tab(s) orally once a day  MiraLax oral powder for reconstitution: 17 gram(s) orally once a day, As Needed constipation  pantoprazole 40 mg oral delayed release tablet: 1 tab(s) orally 2 times a day  pravastatin 40 mg oral tablet: 1 tab(s) orally once a day  rivaroxaban 20 mg oral tablet: 1 tab(s) orally once a day  Senna Lax 8.6 mg oral tablet: 2 tab(s) orally once a day (at bedtime), As Needed constipation  Slow Fe 160 mg (50 mg elemental iron) oral tablet, extended release: 1 tab(s) orally once a day

## 2021-03-20 NOTE — PROGRESS NOTE ADULT - PROBLEM SELECTOR PLAN 10
Chemical VTE ppx contraindicated at this time due to anemia, positive FOBT, GIB w/up in progress, continue to hold xarelto   - SCDs    11. Chronic exertional dyspnea  - Pt has a reported hx of chronic HSU  - Former smoker, quit 30 years ago, smoked 1 ppd for 20 years  - outpatient follow up with Pulm and cardio  -also noted on CXR to have Basilar linear density slight in degree improved from prior. She was instructed to f/u with PCP as outpatient to ensure resolution. VTE ppx: will restart Xarelto this evening as GI clearing to restart  - SCDs    11. Chronic exertional dyspnea  - Pt has a reported hx of chronic HSU  - Former smoker, quit 30 years ago, smoked 1 ppd for 20 years  - outpatient follow up with Pulm and cardio  -also noted on CXR to have Basilar linear density slight in degree improved from prior. She was instructed to f/u with PCP as outpatient to ensure resolution.

## 2021-03-20 NOTE — PROGRESS NOTE ADULT - PROBLEM SELECTOR PLAN 7
- s/p repair in 7/2020 with thoracic surgeon Dr. Larios  - c/w david - s/p repair in 7/2020 with thoracic surgeon Dr. Larios  - c/w protonix  - vanessa f/up with her surgeon

## 2021-03-20 NOTE — PROGRESS NOTE ADULT - PROBLEM SELECTOR PLAN 5
Chronic, stable diagnosed by cardiac catheterization in 8/2014, no hx of stents  - follows with Dr. Nesbitt   - Continue statin   - Cardio consulted Dr. Britt   - Transfuse for Hgb <8
Chronic, stable diagnosed by cardiac catheterization in 8/2014, no hx of stents  - follows with Dr. Nesbitt   - Continue statin   - Cardio consulted Dr. Britt   - Transfuse for Hgb <8

## 2021-03-20 NOTE — PROGRESS NOTE ADULT - PROBLEM SELECTOR PLAN 2
Pt presented with 4 day hx of pre-syncope with positive orthostatics in outpatient cardiologist office. Likely due to symptomatic anemia.   -echo EF 50-55%, mild LVH   -symptoms resolved   - Plan as above  - orthostatics negative 3/19, continue to check orthostatics daily  - Cardio consulted (Dr. Britt)  - Remote tele and hemodynamic monitoring Pt presented with 4-day hx of pre-syncope with positive orthostatics in outpatient cardiologist office. Likely due to symptomatic anemia.   -echo EF 50-55%, mild LVH   - symptoms resolved   - Plan as above  - orthostatics negative 3/19, recheck orthostatics today  - Cardio consulted (Dr. Britt), recs appreciated  - Remote tele and hemodynamic monitoring

## 2021-03-20 NOTE — PROGRESS NOTE ADULT - SUBJECTIVE AND OBJECTIVE BOX
INTERVAL HPI/OVERNIGHT EVENTS:  pt seen and examined  denies n/v/abd pain  reports dark bms yesterday but r/t iron  tolerating diet  no s/s active gib overnight per nursing    MEDICATIONS  (STANDING):  atorvastatin 10 milliGRAM(s) Oral at bedtime  diltiazem    Tablet 30 milliGRAM(s) Oral every 6 hours  ferrous    sulfate 325 milliGRAM(s) Oral daily  melatonin 5 milliGRAM(s) Oral at bedtime  metoprolol succinate ER 25 milliGRAM(s) Oral daily  pantoprazole  Injectable 40 milliGRAM(s) IV Push two times a day    MEDICATIONS  (PRN):      Allergies    doxycycline (Unknown)    Intolerances        Review of Systems:    General:  No wt loss, fevers, chills, night sweats, fatigue   Eyes:  Good vision, no reported pain  ENT:  No sore throat, pain, runny nose, dysphagia  CV:  No pain, palpitations, hypo/hypertension  Resp:  No dyspnea, cough, tachypnea, wheezing  GI:  see above   :  No pain, bleeding, incontinence, nocturia  Muscle:  No pain, weakness  Neuro:  No weakness, tingling, memory problems  Psych:  No fatigue, insomnia, mood problems, depression  Endocrine:  No polyuria, polydypsia, cold/heat intolerance  Heme:  No petechiae, ecchymosis, easy bruisability  Skin:  No rash, tattoos, scars, edema      Vital Signs Last 24 Hrs  T(C): 36.8 (20 Mar 2021 05:28), Max: 37.2 (19 Mar 2021 20:25)  T(F): 98.3 (20 Mar 2021 05:28), Max: 99 (19 Mar 2021 20:25)  HR: 90 (20 Mar 2021 05:28) (90 - 98)  BP: 106/72 (20 Mar 2021 05:28) (106/72 - 125/75)  BP(mean): --  RR: 18 (20 Mar 2021 05:28) (18 - 18)  SpO2: 92% (20 Mar 2021 05:28) (92% - 97%)    PHYSICAL EXAM:    Constitutional: lying in bed  HEENT: ncat  Gastrointestinal: soft nt nd  Extremities: No peripheral edema  Vascular: + peripheral pulses  Neurological: Awake alert appropriate      LABS:                        8.3    x     )-----------( x        ( 19 Mar 2021 14:06 )             26.5     03-    141  |  108  |  21  ----------------------------<  96  3.9   |  27  |  0.92    Ca    8.8      19 Mar 2021 07:16  Phos  2.6     -  Mg     2.1     -    TPro  6.1  /  Alb  3.0<L>  /  TBili  0.5  /  DBili  x   /  AST  12<L>  /  ALT  12  /  AlkPhos  77  03-19    PT/INR - ( 19 Mar 2021 07:16 )   PT: 24.7 sec;   INR: 2.19 ratio         PTT - ( 19 Mar 2021 07:16 )  PTT:33.6 sec  Urinalysis Basic - ( 18 Mar 2021 16:32 )    Color: Yellow / Appearance: Slightly Turbid / S.025 / pH: x  Gluc: x / Ketone: Trace  / Bili: Negative / Urobili: Negative   Blood: x / Protein: Negative / Nitrite: Negative   Leuk Esterase: Negative / RBC: x / WBC 0-2   Sq Epi: x / Non Sq Epi: Moderate / Bacteria: Occasional        RADIOLOGY & ADDITIONAL TESTS:

## 2021-03-20 NOTE — PROGRESS NOTE ADULT - SUBJECTIVE AND OBJECTIVE BOX
CHIEF COMPLAINT: Patient is a 75y old  Female who presents with a chief complaint of Presyncope, anemia (19 Mar 2021 18:25)      Follow Up: [ ] Chest Pain      [ ] Dyspnea     [ ] Palpitations    [x ] Atrial Fibrillation     [ ] Ventricular Dysrhythmia    [ ] Abnormal EKG                      [ ] Abnormal Cardiac Enzymes     [ ] Valvular Disease   [x ] CAD  [ x] Hypertension [ ] CHF      HPI:  Pt is a 74 yo F with a PMHx of HTN, HLD, afib on xarelto s/p ppm in 10/20, hiatal hernia s/p repair in 7/20, chronic iron deficiency anemia, CAD no stents, CKD, thoracic aortic aneurysm who presents with lightheadedness. Pt says on Monday when standing from a seated position she started to feel lightheadedness and like she was going to pass out. After experiencing this, she took an iron tablet for which she previously stopped taking due to constipation. Of note, pt prescribed iron tablets for chronic iron deficiency anemia of unknown source. Her hiatal hernia was discovered incidentally after upper and lower EGD which was performed for anemia workup and was repaired in 7/2020. She says her symptoms persisted throughout the week which prompted her to follow up with her cardiologist today Dr. Nesbitt. In the office she was pale with orthostatic hypotension which was documented in the office today and was told to come to the ed for admission. She denies lightheadedness at rest but says she feels like she will pass out when standing. She denies dizziness, headaches, chest pain, difficulty breathing, abdominal pain, n/v/d/c, melena or hematochezia. Last colonoscopy 1 years ago per pt and was "normal", done by Dr Ya. Of note pt says she lost considerable amount of weight since her hiatal hernia repair 2/2 to decreased appetite/loss of desire to eat red meat.    In the ED:   VS: BP 99/57, R 16, T 98, Spo2 98 Hr 74  Labs signifcant H/H 7.8/26.0 wbc 7.80, K 3.3, BUN/Cr: 31/1.20, BNP 1084, trops negative x 1  CXR   IMPRESSION: Basilar linear density slight in degree improved from prior.  EKG afib, no acute signs of ischemia     s/p 1 unit IV NS, 1 unit PRBC  (18 Mar 2021 14:08)      PAST MEDICAL & SURGICAL HISTORY:  Afib    Thoracic aortic aneurysm  (Stable at 4.1cm since 2012)    Hypertension    Afib    History of colonoscopy  (2014)    S/P hysterectomy with oophorectomy  (Age 45)    S/P hip replacement  (Left hip, 2010)        MEDICATIONS  (STANDING):  atorvastatin 10 milliGRAM(s) Oral at bedtime  diltiazem    Tablet 30 milliGRAM(s) Oral every 6 hours  ferrous    sulfate 325 milliGRAM(s) Oral daily  melatonin 5 milliGRAM(s) Oral at bedtime  metoprolol succinate ER 25 milliGRAM(s) Oral daily  pantoprazole  Injectable 40 milliGRAM(s) IV Push two times a day    Vital Signs Last 24 Hrs  T(C): 36.8 (20 Mar 2021 05:28), Max: 37.2 (19 Mar 2021 20:25)  T(F): 98.3 (20 Mar 2021 05:28), Max: 99 (19 Mar 2021 20:25)  HR: 90 (20 Mar 2021 05:28) (90 - 98)  BP: 106/72 (20 Mar 2021 05:28) (106/72 - 125/75)  BP(mean): --  RR: 18 (20 Mar 2021 05:28) (18 - 18)  SpO2: 92% (20 Mar 2021 05:28) (92% - 97%)    I&O's Summary      PHYSICAL EXAM:    Constitutional: NAD,    Pulmonary: Non-labored, breath sounds are clear bilaterally, No wheezing, rales or rhonchi  Cardiovascular: irregular, S1 and S2, No murmurs, rubs, gallops or clicks  Extremities: No lower extremity clubbing cyanosis or edema      LABS: All Labs Reviewed:                        8.3    x     )-----------( x        ( 19 Mar 2021 14:06 )             26.5                         8.1    5.13  )-----------( 269      ( 19 Mar 2021 07:16 )             25.6                         7.8    7.80  )-----------( 321      ( 18 Mar 2021 11:28 )             26.0     19 Mar 2021 07:16    141    |  108    |  21     ----------------------------<  96     3.9     |  27     |  0.92   18 Mar 2021 11:28    138    |  103    |  31     ----------------------------<  122    3.3     |  25     |  1.20     Ca    8.8        19 Mar 2021 07:16  Ca    9.4        18 Mar 2021 11:28  Phos  2.6       19 Mar 2021 07:16  Mg     2.1       19 Mar 2021 07:16  Mg     2.0       18 Mar 2021 11:28    TPro  6.1    /  Alb  3.0    /  TBili  0.5    /  DBili  x      /  AST  12     /  ALT  12     /  AlkPhos  77     19 Mar 2021 07:16  TPro  7.3    /  Alb  3.5    /  TBili  0.3    /  DBili  x      /  AST  14     /  ALT  16     /  AlkPhos  88     18 Mar 2021 11:28    PT/INR - ( 19 Mar 2021 07:16 )   PT: 24.7 sec;   INR: 2.19 ratio         PTT - ( 19 Mar 2021 07:16 )  PTT:33.6 sec  CARDIAC MARKERS ( 18 Mar 2021 11:28 )  <.015 ng/mL / x     / x     / x     / <1.0 ng/mL      Blood Culture: Organism --  Gram Stain Blood -- Gram Stain --  Specimen Source .Urine Clean Catch (Midstream)  Culture-Blood --      03-18 @ 11:28  Pro Bnp 1084    03-18 @ 11:28  TSH: 1.26

## 2021-03-20 NOTE — PROGRESS NOTE ADULT - ASSESSMENT
74yo F with a PMHx of HTN, HLD, afib on xarelto, s/p PPM in 10/20, hiatal hernia s/p repair in 07/2020, chronic iron deficiency anemia, CKD, thoracic aortic aneurysm, CAD, and chronic exertional dyspnea, admitted for presyncope likely due to symptomatic anemia and orthostatic hypotension.

## 2021-03-20 NOTE — PROGRESS NOTE ADULT - PROBLEM SELECTOR PLAN 1
-symptomatic anemia: r/o UGIB, may also have component of iron deficiency anemia not responsive to oral iron pills. Pt has hx of chronic iron deficiency of anemia of unknown etiology after evaluation with EGD by Dr. Ya a year ago. Pt had a large hiatal hernia, which was surgically repaired in 07/2020  -Hgb 7.8 on admission, outpt records showed Hgb 10.1 in Oct 2020  -EGD from [facility] 2020 showed hiatal hernia, recommended capsule study a t that time  -s/p 1 U pRBCs and 1 U IV NS, suboptimal correction   -1 unit pRBCS this AM, f/u H/H at 5 pm   -would benefit form venofer, consult heme onc Dr. Guerrero   - No signs of gross active bleeding, continue to monitor for bleeding. Pt states her stools are usually very dark due to taking oral iron and are also soft/loose due to taking stool softeners, thus making it difficult to distinguish from melena. It is therefore unclear if bleeding is being masked. No BM yesterday or today to be able to visualize.  -low iron      - Continue ferrous sulfate  -consult heme onc, would benefit form venofer   - c/w protonix 40mg IV BID for now  -needs capsule study outpatient, follow up with outpatient GI after discharge   - FOBT + ; RN aware to catch next bowel movement for a physician to evaluate to perhaps give better idea whether it is apparent melena, no BM yet   - Hold Xarelto for now, consider restarting tomorrow   - Monitor CBCs, transfuse for Hgb <8  - GI consulted (Dr. Hooker), f/u recs -symptomatic anemia: r/o UGIB, may also have component of iron deficiency anemia not responsive to oral iron pills. Pt has hx of chronic iron deficiency of anemia of unknown etiology after evaluation with EGD by Dr. Ya a year ago. Pt had a large hiatal hernia, which was surgically repaired in 07/2020  -Hgb 7.8 on admission, outpt records showed Hgb 10.1 in Oct 2020  -EGD from [facility] 2020 showed hiatal hernia, recommended capsule study a t that time  -s/p 1 U pRBCs and 1 U IV NS, suboptimal correction   -1 unit pRBCS this AM, f/u H/H at 5 pm   -would benefit form venofer, consult heme onc Dr. Guerrero   - No signs of gross active bleeding, continue to monitor for bleeding. Pt states her stools are usually very dark due to taking oral iron and are also soft/loose due to taking stool softeners, thus making it difficult to distinguish from melena. It is therefore unclear if bleeding is being masked. No BM yesterday or today to be able to visualize.  -low iron, low B12, start B12 1000 micrograms qD   - Continue ferrous sulfate  -consult heme onc, would benefit form venofer   - c/w protonix 40mg IV BID for now  -needs capsule study outpatient, follow up with outpatient GI after discharge   - FOBT + ; RN aware to catch next bowel movement for a physician to evaluate to perhaps give better idea whether it is apparent melena, no BM yet   - Hold Xarelto for now, consider restarting tomorrow   - Monitor CBCs, transfuse for Hgb <8  - GI consulted (Dr. Hooker), f/u recs -symptomatic anemia: r/o UGIB, also has component of iron deficiency anemia not responsive to oral iron pills. Also, found to have low B12 which may be contributing. Pt has hx of chronic iron deficiency of anemia of unknown etiology after evaluation with EGD by Dr. Ya a year ago. Pt had a large hiatal hernia, which was surgically repaired in 07/2020  -Hgb 7.8 on admission, outpt records showed Hgb 10.1 in Oct 2020  -EGD from March 2020 showed hiatal hernia, recommended capsule study at that time  -s/p 1 U pRBCs and 1 U IV NS, suboptimal correction   -1 unit pRBCS this AM, f/u H/H at 5 pm   -would benefit from venofer, consult heme onc Dr. Herbert   - No signs of gross active bleeding, continue to monitor for bleeding. Pt states her stools are usually very dark due to taking oral iron and are also soft/loose due to taking stool softeners, thus making it difficult to distinguish from melena. It is therefore unclear if bleeding is being masked. No BM yesterday or today to be able to visualize. Doubt pt is having significant active bleeding at this time, but may have had it prior to admission. Last outpt Hgb was >10.  -low iron, low B12, start B12 1000 micrograms qD   - Continue ferrous sulfate  - c/w protonix 40mg BID for now  - GI ( group) consulted and rec outpatient GI f/up for capsule endoscopy. No plan for EGD on this admission unless evidence of significant active bleeding.  - FOBT+ ; RN aware to catch next bowel movement for a physician to evaluate to perhaps give better idea whether it is apparent melena, no BM yet   - GI cleared to restart Xarelto -- will restart this evening and monitor for stability overnight  - Monitor CBCs, transfuse for Hgb <8 given hx of CAD

## 2021-03-20 NOTE — PROGRESS NOTE ADULT - PROBLEM SELECTOR PLAN 8
GFR improved, 44 on admission, baseline per prior hospital visits ~35-50 (CKD Stage 3)  - Trend renal indices

## 2021-03-20 NOTE — PROGRESS NOTE ADULT - ASSESSMENT
anemia  hiatal hernia  afib    s/p recent egd/colon  will need outpatient capsule  no s/s active gi bleeding; am labs pending  if hgb improves then dc home with outpatient follow up with primary GI  no objection to resume xarelto w monitoring of cbc  gerd/aspiration precautions  rec ppi ppx daily  regular diet as tolerated

## 2021-03-20 NOTE — PROGRESS NOTE ADULT - PROBLEM SELECTOR PLAN 9
- hx of thoracic aortic dilatation with dimension of 4.1 cm in ascending portion   - outpatient follow up with Dr. Nesbitt

## 2021-03-20 NOTE — DISCHARGE NOTE PROVIDER - HOSPITAL COURSE
FROM ADMISSION H+P:   HPI:  Pt is a 76 yo F with a PMHx of HTN, HLD, afib on xarelto s/p ppm in 10/20, hiatal hernia s/p repair in 7/20, chronic iron deficiency anemia, CAD no stents, CKD, thoracic aortic aneurysm who presents with lightheadedness. Pt says on Monday when standing from a seated position she started to feel lightheadedness and like she was going to pass out. After experiencing this, she took an iron tablet for which she previously stopped taking due to constipation. Of note, pt prescribed iron tablets for chronic iron deficiency anemia of unknown source. Her hiatal hernia was discovered incidentally after upper and lower EGD which was performed for anemia workup and was repaired in 7/2020. She says her symptoms persisted throughout the week which prompted her to follow up with her cardiologist today Dr. Nesbitt. In the office she was pale with orthostatic hypotension which was documented in the office today and was told to come to the ed for admission. She denies lightheadedness at rest but says she feels like she will pass out when standing. She denies dizziness, headaches, chest pain, difficulty breathing, abdominal pain, n/v/d/c, melena or hematochezia. Last colonoscopy 1 years ago per pt and was "normal", done by Dr Ya. Of note pt says she lost considerable amount of weight since her hiatal hernia repair 2/2 to decreased appetite/loss of desire to eat red meat.    In the ED:   VS: BP 99/57, R 16, T 98, Spo2 98 Hr 74  Labs signifcant H/H 7.8/26.0 wbc 7.80, K 3.3, BUN/Cr: 31/1.20, BNP 1084, trops negative x 1  CXR   IMPRESSION: Basilar linear density slight in degree improved from prior.  EKG afib, no acute signs of ischemia     s/p 1 unit IV NS, 1 unit PRBC  (18 Mar 2021 14:08)      ---  HOSPITAL COURSE:     Pt admitted for symptomatic anemia, orthostatic hypotension. Suspected due to possible UGIB also suspected to have a component of iron deficiency anemia not responsive to oral iron pills. Pt's xarelto was thus held. Pt has hx of chronic iron deficiency of anemia of unknown etiology after evaluation with EGD by Dr. Ya a year ago.  Pt was seen by GI Luiz Pryor, started on protonix, FOBT was positive, recommended outpatient capsule study.   Pt had 1 U pRBCs and 1 U IV NS with suboptimal correction, thus treated with 2nd unit pRBCs. Heme onc Dr. Trevizo was then consulted for possible venofer given low iron despite oral iron, ________.     Pt was also noted to have presyncope/orthostatic hypotension prior to admission at outpatient cardiologist's office. Cardio Dr. Britt was consulted, recommended echo and orthostatic testing. Echo showed EF 50-55%, mild LVH. Initially BP meds were held including cardizdem, metoprolol succinate, and valsartan/hctz. Orthostatics were monitored remained negative. Later patient developed a fib with RVR, given BP had improved, pt's cardizem was restarted inpatient at a lower dose, metoprolol succinate restarted.     ---  CONSULTANTS:   Dr Joseline Britt cardio   GI Dr. Hooker     ---  TIME SPENT:  I, the attending physician, was physically present for the key portions of the evaluation and management (E/M) service provided. The total amount of time spent reviewing the hospital notes, laboratory values, imaging findings, assessing/counseling the patient, discussing with consultant physicians, social work, nursing staff was -- minutes    ---  Primary care provider was made aware of plan for discharge:      [  ] NO     [  ] YES   FROM ADMISSION H+P:   HPI:  Pt is a 76 yo F with a PMHx of HTN, HLD, afib on xarelto s/p ppm in 10/20, hiatal hernia s/p repair in 7/20, chronic iron deficiency anemia, CAD no stents, CKD, thoracic aortic aneurysm who presents with lightheadedness. Pt says on Monday when standing from a seated position she started to feel lightheadedness and like she was going to pass out. After experiencing this, she took an iron tablet for which she previously stopped taking due to constipation. Of note, pt prescribed iron tablets for chronic iron deficiency anemia of unknown source. Her hiatal hernia was discovered incidentally after upper and lower EGD which was performed for anemia workup and was repaired in 7/2020. She says her symptoms persisted throughout the week which prompted her to follow up with her cardiologist today Dr. Nesbitt. In the office she was pale with orthostatic hypotension which was documented in the office today and was told to come to the ed for admission. She denies lightheadedness at rest but says she feels like she will pass out when standing. She denies dizziness, headaches, chest pain, difficulty breathing, abdominal pain, n/v/d/c, melena or hematochezia. Last colonoscopy 1 years ago per pt and was "normal", done by Dr Ya. Of note pt says she lost considerable amount of weight since her hiatal hernia repair 2/2 to decreased appetite/loss of desire to eat red meat.    In the ED:   VS: BP 99/57, R 16, T 98, Spo2 98 Hr 74  Labs signifcant H/H 7.8/26.0 wbc 7.80, K 3.3, BUN/Cr: 31/1.20, BNP 1084, trops negative x 1  CXR   IMPRESSION: Basilar linear density slight in degree improved from prior.  EKG afib, no acute signs of ischemia     s/p 1 unit IV NS, 1 unit PRBC  (18 Mar 2021 14:08)      ---  HOSPITAL COURSE:     Pt admitted for symptomatic anemia. Anemia suspected due to possible UGIB also suspected to have a component of iron deficiency anemia not optimally responsive to oral iron pills. Pt's xarelto was thus held. Pt has hx of chronic iron deficiency of anemia of unknown etiology after evaluation with EGD by Dr. Ya a year ago.  Pt was seen by GI Luiz Pryor, started on protonix, FOBT was positive, recommended outpatient capsule study. Pt had 1 U pRBCs and 1 U IV NS with suboptimal correction of anemia, thus treated with 2nd unit pRBCs. Heme onc Dr. Trevizo was then consulted for possible venofer given low iron despite oral iron, ________.   Pt was also noted to have presyncope/orthostatic hypotension prior to admission at outpatient cardiologist's office. Cardio Dr. Britt was consulted, recommended echo and orthostatic testing. Echo showed EF 50-55%, mild LVH. Initially BP meds were held including cardizdem, metoprolol succinate, and valsartan/hctz. Orthostatics were monitored remained negative. Later patient developed a fib with RVR, given BP had improved, pt's cardizem was restarted inpatient at a lower dose, metoprolol succinate was also restarted, rapid a fib thus resolved.      PT saw patient and recommended _____. pt seen and exmained on day of discharge, medically optimized for discharge home with close outpatient follow up.     ---  CONSULTANTS:   Dr Trevizo heme onc   Dr. Britt cardio   GI Dr. Hooker     ---  TIME SPENT:  I, the attending physician, was physically present for the key portions of the evaluation and management (E/M) service provided. The total amount of time spent reviewing the hospital notes, laboratory values, imaging findings, assessing/counseling the patient, discussing with consultant physicians, social work, nursing staff was -- minutes    ---  Primary care provider was made aware of plan for discharge:      [  ] NO     [  ] YES   FROM ADMISSION H+P:   HPI:  Pt is a 74 yo F with a PMHx of HTN, HLD, afib on xarelto s/p ppm in 10/20, hiatal hernia s/p repair in 7/20, chronic iron deficiency anemia, CAD no stents, CKD, thoracic aortic aneurysm who presents with lightheadedness. Pt says on Monday when standing from a seated position she started to feel lightheadedness and like she was going to pass out. After experiencing this, she took an iron tablet for which she previously stopped taking due to constipation. Of note, pt prescribed iron tablets for chronic iron deficiency anemia of unknown source. Her hiatal hernia was discovered incidentally after upper and lower EGD which was performed for anemia workup and was repaired in 7/2020. She says her symptoms persisted throughout the week which prompted her to follow up with her cardiologist today Dr. Nesbitt. In the office she was pale with orthostatic hypotension which was documented in the office today and was told to come to the ed for admission. She denies lightheadedness at rest but says she feels like she will pass out when standing. She denies dizziness, headaches, chest pain, difficulty breathing, abdominal pain, n/v/d/c, melena or hematochezia. Last colonoscopy 1 years ago per pt and was "normal", done by Dr Ya. Of note pt says she lost considerable amount of weight since her hiatal hernia repair 2/2 to decreased appetite/loss of desire to eat red meat.    In the ED:   VS: BP 99/57, R 16, T 98, Spo2 98 Hr 74  Labs signifcant H/H 7.8/26.0 wbc 7.80, K 3.3, BUN/Cr: 31/1.20, BNP 1084, trops negative x 1  CXR   IMPRESSION: Basilar linear density slight in degree improved from prior.  EKG afib, no acute signs of ischemia     s/p 1 unit IV NS, 1 unit PRBC  (18 Mar 2021 14:08)      ---  HOSPITAL COURSE:     Patient admitted for symptomatic anemia. Anemia suspected due to possible UGIB also suspected to have a component of iron deficiency anemia not optimally responsive to oral iron pills. Patient's xarelto was thus held. Patient has hx of chronic iron deficiency of anemia of unknown etiology after evaluation with EGD by Dr. Ya a year ago.  Patient was seen by GI Dr. Dee, started on protonix, FOBT was positive, recommended outpatient capsule study. Patient had 1 U pRBCs and 1 U IV NS with suboptimal correction of anemia, thus treated with 2nd unit pRBCs. Heme onc Dr. Trevizo was then consulted for possible venofer given low iron despite oral iron,  and was given 2 doses of venofer during her hospitalization. She was also found to have low-normal Vitamin B12      Pt was also noted to have presyncope/orthostatic hypotension prior to admission at outpatient cardiologist's office. Cardio Dr. Britt was consulted, recommended echo and orthostatic testing. Echo showed EF 50-55%, mild LVH. Initially BP meds were held including cardizdem, metoprolol succinate, and valsartan/hctz. Orthostatics were monitored remained negative. Later patient developed a fib with RVR, given BP had improved, pt's cardizem was restarted inpatient at a lower dose, metoprolol succinate was also restarted, rapid a fib thus resolved.      PT saw patient and recommended _____. pt seen and exmained on day of discharge, medically optimized for discharge home with close outpatient follow up.     ---  CONSULTANTS:   Dr Trevizo heme onc   Dr. Britt cardio   GI Dr. Hooker     ---  TIME SPENT:  I, the attending physician, was physically present for the key portions of the evaluation and management (E/M) service provided. The total amount of time spent reviewing the hospital notes, laboratory values, imaging findings, assessing/counseling the patient, discussing with consultant physicians, social work, nursing staff was -- minutes    ---  Primary care provider was made aware of plan for discharge:      [  ] NO     [  ] YES   FROM ADMISSION H+P:   HPI:  Pt is a 76 yo F with a PMHx of HTN, HLD, afib on xarelto s/p ppm in 10/20, hiatal hernia s/p repair in 7/20, chronic iron deficiency anemia, CAD no stents, CKD, thoracic aortic aneurysm who presents with lightheadedness. Pt says on Monday when standing from a seated position she started to feel lightheadedness and like she was going to pass out. After experiencing this, she took an iron tablet for which she previously stopped taking due to constipation. Of note, pt prescribed iron tablets for chronic iron deficiency anemia of unknown source. Her hiatal hernia was discovered incidentally after upper and lower EGD which was performed for anemia workup and was repaired in 7/2020. She says her symptoms persisted throughout the week which prompted her to follow up with her cardiologist today Dr. Nesbitt. In the office she was pale with orthostatic hypotension which was documented in the office today and was told to come to the ed for admission. She denies lightheadedness at rest but says she feels like she will pass out when standing. She denies dizziness, headaches, chest pain, difficulty breathing, abdominal pain, n/v/d/c, melena or hematochezia. Last colonoscopy 1 years ago per pt and was "normal", done by Dr Ya. Of note pt says she lost considerable amount of weight since her hiatal hernia repair 2/2 to decreased appetite/loss of desire to eat red meat.    In the ED:   VS: BP 99/57, R 16, T 98, Spo2 98 Hr 74  Labs signifcant H/H 7.8/26.0 wbc 7.80, K 3.3, BUN/Cr: 31/1.20, BNP 1084, trops negative x 1  CXR   IMPRESSION: Basilar linear density slight in degree improved from prior.  EKG afib, no acute signs of ischemia     s/p 1 unit IV NS, 1 unit PRBC  (18 Mar 2021 14:08)      ---  HOSPITAL COURSE:     Patient admitted for symptomatic anemia. Anemia suspected due to possible UGIB also suspected to have a component of iron deficiency anemia not optimally responsive to oral iron pills. Patient's xarelto was thus held. Patient has hx of chronic iron deficiency of anemia of unknown etiology after evaluation with EGD by Dr. Ya a year ago.  Patient was seen by GI Dr. Dee, started on protonix, FOBT was positive, recommended outpatient capsule study. Patient had 1 U pRBCs and 1 U IV NS with suboptimal correction of anemia, thus treated with 2nd unit pRBCs. Heme onc Dr. Trevizo was then consulted for possible venofer given low iron despite oral iron,  and was given 2 doses of venofer during her hospitalization. She was restarted on her Xarelto with close CBC monitoring. She was also found to have low-normal Vitamin B12, was started on Vitamin B12 supplementation.     Pt was also noted to have presyncope/orthostatic hypotension prior to admission at outpatient cardiologist's office. Cardio Dr. Britt was consulted, recommended echo and orthostatic testing. Echo showed EF 50-55%, mild LVH. Initially BP meds were held including cardizdem, metoprolol succinate, and valsartan/hctz. Orthostatics were monitored remained negative. Later patient developed a fib with RVR, given BP had improved, pt's cardizem was restarted inpatient at a lower dose, metoprolol succinate was also restarted, rapid a fib thus resolved.      Patient seen and examined on day of discharge, medically optimized for discharge home with close outpatient follow up.     On day of discharge:  REVIEW OF SYSTEMS:  CONSTITUTIONAL: No weakness, fevers or chills  EYES/ENT: No visual changes  RESPIRATORY: No cough, wheezing, hemoptysis; No shortness of breath  CARDIOVASCULAR: No chest pain or palpitations  GASTROINTESTINAL: No abdominal or epigastric pain. No nausea, vomiting, or hematemesis; No diarrhea or constipation. +Dark stool  GENITOURINARY: No dysuria, frequency or hematuria  NEUROLOGICAL: No numbness or weakness  SKIN: No itching, rashes    VITALS:   Vital Signs Last 24 Hrs  T(C): 36.5 (21 Mar 2021 12:25), Max: 37 (21 Mar 2021 05:11)  T(F): 97.7 (21 Mar 2021 12:25), Max: 98.6 (21 Mar 2021 05:11)  HR: 88 (21 Mar 2021 12:25) (83 - 107)  BP: 112/74 (21 Mar 2021 12:25) (106/73 - 129/84)  BP(mean): --  RR: 18 (21 Mar 2021 12:25) (18 - 18)  SpO2: 95% (21 Mar 2021 12:25) (91% - 96%)    GENERAL: NAD, lying in bed comfortably  HEENT:  anicteric, moist mucous membranes  CHEST/LUNG:  CTA b/l, no rales, wheezes, or rhonchi  HEART:  RRR, S1, S2  ABDOMEN:  BS+, soft, nontender, nondistended  EXTREMITIES: trace b/l LE edema, no cyanosis or calf tenderness  NERVOUS SYSTEM: answers questions and follows commands appropriately      ---  CONSULTANTS:   Dr Trevizo heme onc   Dr. Britt cardio   GI Dr. Hooker     ---  TIME SPENT:  I, the attending physician, was physically present for the key portions of the evaluation and management (E/M) service provided. The total amount of time spent reviewing the hospital notes, laboratory values, imaging findings, assessing/counseling the patient, discussing with consultant physicians, social work, nursing staff was 45 minutes    ---  Primary care provider was made aware of plan for discharge:      [  ] NO     [  ] YES   FROM ADMISSION H+P:   HPI:  Pt is a 76 yo F with a PMHx of HTN, HLD, afib on xarelto s/p ppm in 10/20, hiatal hernia s/p repair in 7/20, chronic iron deficiency anemia, CAD no stents, CKD, thoracic aortic aneurysm who presents with lightheadedness. Pt says on Monday when standing from a seated position she started to feel lightheadedness and like she was going to pass out. After experiencing this, she took an iron tablet for which she previously stopped taking due to constipation. Of note, pt prescribed iron tablets for chronic iron deficiency anemia of unknown source. Her hiatal hernia was discovered incidentally after upper and lower EGD which was performed for anemia workup and was repaired in 7/2020. She says her symptoms persisted throughout the week which prompted her to follow up with her cardiologist today Dr. Nesbitt. In the office she was pale with orthostatic hypotension which was documented in the office today and was told to come to the ed for admission. She denies lightheadedness at rest but says she feels like she will pass out when standing. She denies dizziness, headaches, chest pain, difficulty breathing, abdominal pain, n/v/d/c, melena or hematochezia. Last colonoscopy 1 years ago per pt and was "normal", done by Dr Ya. Of note pt says she lost considerable amount of weight since her hiatal hernia repair 2/2 to decreased appetite/loss of desire to eat red meat.    In the ED:     VS: BP 99/57, R 16, T 98, Spo2 98 Hr 74  Labs signifcant H/H 7.8/26.0 wbc 7.80, K 3.3, BUN/Cr: 31/1.20, BNP 1084, trops negative x 1  CXR   IMPRESSION: Basilar linear density slight in degree improved from prior.  EKG afib, no acute signs of ischemia     s/p 1 unit IV NS, 1 unit PRBC  (18 Mar 2021 14:08)      ---  HOSPITAL COURSE:     Patient admitted for symptomatic anemia. Anemia suspected due to possible UGIB also suspected to have a component of iron deficiency anemia not optimally responsive to oral iron pills. Patient's xarelto was thus held. Patient has hx of chronic iron deficiency of anemia of unknown etiology after evaluation with EGD by Dr. Ya a year ago.  Patient was seen by GI Dr. Dee, started on protonix, FOBT was positive, recommended outpatient capsule study. Patient had 1 U pRBCs and 1 U IV NS with suboptimal correction of anemia, thus treated with 2nd unit pRBCs. Heme onc Dr. Trevizo was then consulted for possible venofer given low iron despite oral iron,  and was given 2 doses of venofer during her hospitalization. She was restarted on her Xarelto with close CBC monitoring. She was also found to have low-normal Vitamin B12, was started on Vitamin B12 supplementation.     Pt was also noted to have presyncope/orthostatic hypotension prior to admission at outpatient cardiologist's office. Cardio Dr. Britt was consulted, recommended echo and orthostatic testing. Echo showed EF 50-55%, mild LVH. Initially BP meds were held including cardizdem, metoprolol succinate, and valsartan/hctz. Orthostatics were monitored remained negative. Later patient developed a fib with RVR, given BP had improved, pt's cardizem was restarted inpatient at a lower dose, metoprolol succinate was also restarted, rapid a fib thus resolved.      Patient seen and examined on day of discharge, medically optimized for discharge home with close outpatient follow up.     On day of discharge: Pt denies lightheadedness, vertigo, SOB, HSU, CP, palpitations. Pt states she chronically has dark stool when taking iron supplements, but has not had a BM in two days now.    VITALS:   Vital Signs Last 24 Hrs  T(C): 36.5 (21 Mar 2021 12:25), Max: 37 (21 Mar 2021 05:11)  T(F): 97.7 (21 Mar 2021 12:25), Max: 98.6 (21 Mar 2021 05:11)  HR: 88 (21 Mar 2021 12:25) (83 - 107)  BP: 112/74 (21 Mar 2021 12:25) (106/73 - 129/84)  BP(mean): --  RR: 18 (21 Mar 2021 12:25) (18 - 18)  SpO2: 95% (21 Mar 2021 12:25) (91% - 96%)    Physical Exam:  GENERAL: NAD, lying in bed comfortably  HEENT:  anicteric, moist mucous membranes  CHEST/LUNG:  CTA b/l, no rales, wheezes, or rhonchi  HEART:  irregular at 88bpm, S1, S2  ABDOMEN:  BS+, soft, nontender, nondistended  EXTREMITIES: trace b/l LE edema, no cyanosis or calf tenderness  NERVOUS SYSTEM: answers questions and follows commands appropriately      ---  CONSULTANTS:   Dr Joseline hogue onc   Dr. Britt cardio   GI Dr. Hooker     ---  TIME SPENT: 45min

## 2021-03-20 NOTE — CONSULT NOTE ADULT - SUBJECTIVE AND OBJECTIVE BOX
Patient is a 75y old  Female who presents with a chief complaint of Presyncope, anemia (20 Mar 2021 15:10)      HPI:  Pt is a 74 yo F with a PMHx of HTN, HLD, afib on xarelto s/p ppm in 10/20, hiatal hernia s/p repair in 7/20, chronic iron deficiency anemia on oral iron, previously noted to have hiatal hernia and gastritis; she underwent lap-Nissen procedure by Dr. Desouza at [facility] in 7/2020, also with history of CAD, CKD and thoracic aortic aneurysm presented to the ER on 3/18/21 with light-headedness and found to have progressive anemia.  Patient noted to have increasing symptoms of reflux since having Lap-Nissen procedure done;   Last colonoscopy 1 years ago per pt and was "normal", done by Dr Ya.    She received pRBC and felt much better; asked now to evaluate    ROS:  Negative except for: reflux, difficulty eating at time, fatigue/lightheadedness    PAST MEDICAL & SURGICAL HISTORY:  Afib  Thoracic aortic aneurysm (Stable at 4.1cm since 2012)  Hypertension  Afib  S/P hysterectomy with oophorectomy (Age 45)  S/P hip replacement (Left hip, 2010)    SOCIAL HISTORY:  - denies EtOH  - denies tobacco use  - lives at home    FAMILY HISTORY:  Family history of cardiomyopathy (Sibling)  Family history of abdominal aortic aneurysm (Mother)        MEDICATIONS  (STANDING):  atorvastatin 10 milliGRAM(s) Oral at bedtime  cyanocobalamin 1000 MICROGram(s) Oral daily  diltiazem    Tablet 30 milliGRAM(s) Oral every 6 hours  ferrous    sulfate 325 milliGRAM(s) Oral daily  iron sucrose Injectable 100 milliGRAM(s) IV Push every 24 hours  melatonin 5 milliGRAM(s) Oral at bedtime  metoprolol succinate ER 25 milliGRAM(s) Oral daily  pantoprazole  Injectable 40 milliGRAM(s) IV Push two times a day    MEDICATIONS  (PRN):      Allergies    doxycycline (Unknown)    Intolerances        Vital Signs Last 24 Hrs  T(C): 36.8 (20 Mar 2021 15:50), Max: 37.2 (19 Mar 2021 20:25)  T(F): 98.3 (20 Mar 2021 15:50), Max: 99 (19 Mar 2021 20:25)  HR: 95 (20 Mar 2021 15:50) (80 - 98)  BP: 106/73 (20 Mar 2021 15:50) (103/68 - 112/75)  RR: 18 (20 Mar 2021 15:50) (18 - 18)  SpO2: 96% (20 Mar 2021 15:50) (92% - 96%)    PHYSICAL EXAM  General: adult in NAD  HEENT: clear oropharynx, anicteric sclera, pink conjunctivae  Neck: supple  CV: normal S1S2 with no murmur rubs or gallops  Lungs: clear to auscultation, no wheezes, no rhales  Abdomen: soft non-tender non-distended, no hepato/splenomegaly  Ext: no clubbing cyanosis or edema  Skin: no rashes and no petichiae  Neuro: alert and oriented X3 no focal deficits      LABS:    Hemoglobin: 10.2 g/dL (03-20 @ 17:10)  Hemoglobin: 8.1 g/dL (03-20 @ 09:49)  Hemoglobin: 8.3 g/dL (03-19 @ 14:06)  Hemoglobin: 8.1 g/dL (03-19 @ 07:16)  Hemoglobin: 7.8 g/dL (03-18 @ 11:28)    WBC Count: 4.67 K/uL (03-20 @ 09:49)  WBC Count: 5.13 K/uL (03-19 @ 07:16)  WBC Count: 7.80 K/uL (03-18 @ 11:28)    Platelet Count - Automated: 281 K/uL (03-20 @ 09:49)  Platelet Count - Automated: 269 K/uL (03-19 @ 07:16)  Platelet Count - Automated: 321 K/uL (03-18 @ 11:28)      03-20    143  |  109<H>  |  16  ----------------------------<  95  4.4   |  30  |  0.91    Ca    9.6      20 Mar 2021 09:49  Phos  2.6     03-19  Mg     2.1     03-19    TPro  6.2  /  Alb  3.0<L>  /  TBili  0.3  /  DBili  x   /  AST  11<L>  /  ALT  11<L>  /  AlkPhos  73  03-20    PT/INR - ( 20 Mar 2021 09:49 )   PT: 13.5 sec;   INR: 1.16 ratio    PTT - ( 20 Mar 2021 09:49 )  PTT:28.7 sec    Stool Guaiac 3/19/21 +    Iron 37  TIBC 372  Iron sat 10%  Ferritin 95  B12 278      BLOOD SMEAR INTERPRETATION:    * RBC - normocytic, normochromic, no anisiocytosis or poikiolocytosis  * WBC - neutrophils with normal morphology, small mature lymphs, no evidence of left shift  * Platelets - normal in number and morphology   
[facility]    CHIEF COMPLAINT: Patient is a 75y old  Female who presents with a chief complaint of Presyncope, anemia (19 Mar 2021 08:42)      HPI:  Pt is a 74 yo F with a PMHx of HTN, HLD, afib on xarelto s/p ppm in 10/20, hiatal hernia s/p repair in 7/20, chronic iron deficiency anemia, CAD no stents, CKD, thoracic aortic aneurysm who presents with lightheadedness. Pt says on Monday when standing from a seated position she started to feel lightheadedness and like she was going to pass out. After experiencing this, she took an iron tablet for which she previously stopped taking due to constipation. Of note, pt prescribed iron tablets for chronic iron deficiency anemia of unknown source. Her hiatal hernia was discovered incidentally after upper and lower EGD which was performed for anemia workup and was repaired in 7/2020. She says her symptoms persisted throughout the week which prompted her to follow up with her cardiologist today Dr. Nesbitt. In the office she was pale with orthostatic hypotension which was documented in the office today and was told to come to the ed for admission. She denies lightheadedness at rest but says she feels like she will pass out when standing. She denies dizziness, headaches, chest pain, difficulty breathing, abdominal pain, n/v/d/c, melena or hematochezia. Last colonoscopy 1 years ago per pt and was "normal", done by Dr Ya. Of note pt says she lost considerable amount of weight since her hiatal hernia repair 2/2 to decreased appetite/loss of desire to eat red meat.    In the ED:   VS: BP 99/57, R 16, T 98, Spo2 98 Hr 74  Labs signifcant H/H 7.8/26.0 wbc 7.80, K 3.3, BUN/Cr: 31/1.20, BNP 1084, trops negative x 1  CXR   IMPRESSION: Basilar linear density slight in degree improved from prior.  EKG afib, no acute signs of ischemia     s/p 1 unit IV NS, 1 unit PRBC  (18 Mar 2021 14:08)    PAST MEDICAL & SURGICAL HISTORY:  Afib    Thoracic aortic aneurysm  (Stable at 4.1cm since 2012)    Hypertension    Afib    History of colonoscopy  (2014)    S/P hysterectomy with oophorectomy  (Age 45)    S/P hip replacement  (Left hip, 2010)      SOCIAL HISTORY: no tobacco  FAMILY HISTORY:  Family history of cardiomyopathy (Sibling)    Family history of abdominal aortic aneurysm (Mother)     HTN  MEDICATIONS  (STANDING):  atorvastatin 10 milliGRAM(s) Oral at bedtime  ferrous    sulfate 325 milliGRAM(s) Oral daily  melatonin 5 milliGRAM(s) Oral at bedtime  pantoprazole  Injectable 40 milliGRAM(s) IV Push two times a day    MEDICATIONS  (PRN):    Allergies    doxycycline (Unknown)    Intolerances        REVIEW OF SYSTEMS:  CONSTITUTIONAL: No weakness, no fevers   EYES/ENT: No visual changes  NECK: No pain or stiffness  RESPIRATORY: No shortness of breath  CARDIOVASCULAR: No chest pain or palpitations  GASTROINTESTINAL: No abdominal pain  GENITOURINARY: No hematuria  NEUROLOGICAL: No weakness  SKIN: No rash  All other review of systems is negative unless indicated above    VITAL SIGNS:   Vital Signs Last 24 Hrs  T(C): 36.7 (19 Mar 2021 04:55), Max: 36.7 (18 Mar 2021 12:35)  T(F): 98.1 (19 Mar 2021 04:55), Max: 98.1 (18 Mar 2021 18:25)  HR: 88 (19 Mar 2021 04:55) (59 - 88)  BP: 104/68 (19 Mar 2021 04:55) (90/59 - 108/60)  BP(mean): --  RR: 16 (19 Mar 2021 04:55) (15 - 16)  SpO2: 92% (19 Mar 2021 04:55) (92% - 100%)  I&O's Summary    PHYSICAL EXAM:  Constitutional: NAD  Neurological: Alert and oriented  HEENT: EOMI, no JVD  Cardiovascular: S1 and S2, no murmur, irr  Pulmonary: breath sounds bilaterally  Gastrointestinal: Bowel Sounds present, soft, nontender  Ext: no peripheral edema  Skin: No rashes, No cyanosis.  Psych:  Mood calm    LABS: All Labs Reviewed:                        8.1    5.13  )-----------( 269      ( 19 Mar 2021 07:16 )             25.6     03-19    141  |  108  |  21  ----------------------------<  96  3.9   |  27  |  0.92    Ca    8.8      19 Mar 2021 07:16  Phos  2.6     03-19  Mg     2.1     03-19    TPro  6.1  /  Alb  3.0<L>  /  TBili  0.5  /  DBili  x   /  AST  12<L>  /  ALT  12  /  AlkPhos  77  03-19    PT/INR - ( 19 Mar 2021 07:16 )   PT: 24.7 sec;   INR: 2.19 ratio         PTT - ( 19 Mar 2021 07:16 )  PTT:33.6 sec  CARDIAC MARKERS ( 18 Mar 2021 11:28 )  <.015 ng/mL / x     / x     / x     / <1.0 ng/mL      12 Lead ECG:   Ventricular Rate 85 BPM    Atrial Rate 92 BPM    QRS Duration 92 ms    Q-T Interval 410 ms    QTC Calculation(Bazett) 487 ms    R Axis 40 degrees    T Axis 9 degrees    Diagnosis Line Atrial fibrillation  Nonspecific ST and T wave abnormality  Confirmed by KYLEE JIMÉNEZ ([id number]) on 3/18/2021 12:00:47 PM (03-18-21 @ 11:14)      Pt is a 74 yo F with a PMHx of HTN, HLD, afib on xarelto s/p ppm in 10/20, hiatal hernia s/p repair in 7/20, chronic iron deficiency anemia, CAD no stents, CKD, thoracic aortic aneurysm who presents with lightheadedness. Pt says on Monday when standing from a seated position she started to feel lightheadedness and like she was going to pass out. After experiencing this, she took an iron tablet for which she previously stopped taking due to constipation. Of note, pt prescribed iron tablets for chronic iron deficiency anemia of unknown source. Her hiatal hernia was discovered incidentally after upper and lower EGD which was performed for anemia workup and was repaired in 7/2020. She says her symptoms persisted throughout the week which prompted her to follow up with her cardiologist today Dr. Nesbitt. In the office she was pale with orthostatic hypotension which was documented in the office today and was told to come to the ed for admission. She denies lightheadedness at rest but says she feels like she will pass out when standing. She denies dizziness, headaches, chest pain, difficulty breathing, abdominal pain, n/v/d/c, melena or hematochezia. Last colonoscopy 1 years ago per pt and was "normal", done by Dr Ya. Of note pt says she lost considerable amount of weight since her hiatal hernia repair 2/2 to decreased appetite/loss of desire to eat red meat.    pt denies CP or SOB today  s/p 1 unit prbc  Hgb 8.1, would repeat, if >8.5 and if no GI contraindication would restart Xarelto tonight or tomorrow  FOBT pending  Pre-saenz Echo EF 50-55%  Bp stable off BP meds  Pt sees Dr Nesbitt in office [phone]            
[facility]  Kong Clemente PA-C  [address] [place] [address]  [phone]      Chief Complaint:  Patient is a 75y old  Female who presents with a chief complaint of Presyncope, anemia (19 Mar 2021 10:08)      HPI: Pt is a 76 yo F with a PMHx of HTN, HLD, afib on xarelto s/p ppm in 10/20, hiatal hernia s/p repair in , chronic iron deficiency anemia, CAD no stents, CKD, thoracic aortic aneurysm who presents with lightheadedness. Pt says on Monday when standing from a seated position she started to feel lightheadedness and like she was going to pass out. After experiencing this, she took an iron tablet for which she previously stopped taking due to constipation. Of note, pt prescribed iron tablets for chronic iron deficiency anemia of unknown source. Her hiatal hernia was discovered incidentally after upper and lower EGD which was performed for anemia workup and was repaired in 2020. She says her symptoms persisted throughout the week which prompted her to follow up with her cardiologist today Dr. Nesbitt. In the office she was pale with orthostatic hypotension which was documented in the office today and was told to come to the ed for admission. She denies lightheadedness at rest but says she feels like she will pass out when standing. She denies dizziness, headaches, chest pain, difficulty breathing, abdominal pain, n/v/d/c, melena or hematochezia. Last colonoscopy 1 years ago per pt and was "normal", done by Dr Ya. Of note pt says she lost considerable amount of weight since her hiatal hernia repair 2/ to decreased appetite/loss of desire to eat red meat.    Allergies:  doxycycline (Unknown)      Medications:  atorvastatin 10 milliGRAM(s) Oral at bedtime  ferrous    sulfate 325 milliGRAM(s) Oral daily  melatonin 5 milliGRAM(s) Oral at bedtime  metoprolol succinate ER 25 milliGRAM(s) Oral daily  pantoprazole  Injectable 40 milliGRAM(s) IV Push two times a day      PMHX/PSHX:  Afib    Thoracic aortic aneurysm    Hypertension    Afib    History of colonoscopy    S/P hysterectomy with oophorectomy    S/P hip replacement        Family history:  Family history of cardiomyopathy (Sibling)    Family history of abdominal aortic aneurysm (Mother)        Social History:     ROS:     General:  No wt loss, fevers, chills, night sweats, fatigue,   Eyes:  Good vision, no reported pain  ENT:  No sore throat, pain, runny nose, dysphagia  CV:  No pain, palpitations, hypo/hypertension  Resp:  No dyspnea, cough, tachypnea, wheezing  GI:  No pain, No nausea, No vomiting, No diarrhea, No constipation, No weight loss, No fever, No pruritis, No rectal bleeding, No tarry stools, No dysphagia,  :  No pain, bleeding, incontinence, nocturia  Muscle:  No pain, weakness  Neuro:  No weakness, tingling, memory problems  Psych:  No fatigue, insomnia, mood problems, depression  Endocrine:  No polyuria, polydipsia, cold/heat intolerance  Heme:  No petechiae, ecchymosis, easy bruisability  Skin:  No rash, tattoos, scars, edema      PHYSICAL EXAM:   Vital Signs:  Vital Signs Last 24 Hrs  T(C): 36.7 (19 Mar 2021 13:00), Max: 36.7 (19 Mar 2021 04:55)  T(F): 98.1 (19 Mar 2021 13:00), Max: 98.1 (19 Mar 2021 04:55)  HR: 96 (19 Mar 2021 16:14) (77 - 96)  BP: 125/75 (19 Mar 2021 16:14) (92/59 - 125/75)  BP(mean): --  RR: 18 (19 Mar 2021 13:00) (15 - 18)  SpO2: 97% (19 Mar 2021 13:00) (92% - 97%)  Daily     Daily Weight in k (19 Mar 2021 04:55)    GENERAL:  Appears stated age,   HEENT:  NC/AT,    CHEST:  Full & symmetric excursion,   HEART:  Regular rhythm  ABDOMEN:  Soft, non-tender, non-distended,   EXTEREMITIES:  no cyanosis,clubbing or edema  SKIN:  No rash  NEURO:  Alert,    LABS:                        8.3    x     )-----------( x        ( 19 Mar 2021 14:06 )             26.5     03-    141  |  108  |  21  ----------------------------<  96  3.9   |  27  |  0.92    Ca    8.8      19 Mar 2021 07:16  Phos  2.6     03-  Mg     2.1     -    TPro  6.1  /  Alb  3.0<L>  /  TBili  0.5  /  DBili  x   /  AST  12<L>  /  ALT  12  /  AlkPhos  77  03-19    LIVER FUNCTIONS - ( 19 Mar 2021 07:16 )  Alb: 3.0 g/dL / Pro: 6.1 g/dL / ALK PHOS: 77 U/L / ALT: 12 U/L / AST: 12 U/L / GGT: x           PT/INR - ( 19 Mar 2021 07:16 )   PT: 24.7 sec;   INR: 2.19 ratio         PTT - ( 19 Mar 2021 07:16 )  PTT:33.6 sec  Urinalysis Basic - ( 18 Mar 2021 16:32 )    Color: Yellow / Appearance: Slightly Turbid / S.025 / pH: x  Gluc: x / Ketone: Trace  / Bili: Negative / Urobili: Negative   Blood: x / Protein: Negative / Nitrite: Negative   Leuk Esterase: Negative / RBC: x / WBC 0-2   Sq Epi: x / Non Sq Epi: Moderate / Bacteria: Occasional          Imaging:

## 2021-03-20 NOTE — PROGRESS NOTE ADULT - PROBLEM SELECTOR PLAN 4
Hx of HTN on Diltiazem, Metoprolol, and Valsartan-Hctz   - Hypotensive on admission with BP in 90s/60s and pt symptomatic, improved to SBP 110s   - Held anti-hypertensives initially  - yesterday afternoon pt went into afib with RVR; given pt's BP had improved from 90s/60s up to SBP in 120s, restarted pt's Toprol XL 25mg po daily; later in the evening also started cardizem 30mg po q6h with hold parameters (will up titrate cardizem as needed and if tolerated by BP -- home dose of 360mg daily)  -continue to hold valsartan-HCTZ Hx of HTN on Diltiazem, Metoprolol, and Valsartan-Hctz   - Hypotensive on admission with BP in 90s/60s and pt symptomatic, improved to -110s now  - Held anti-hypertensives initially  - yesterday afternoon pt went into afib with RVR; given pt's BP had improved from 90s/60s up to SBP in 120s, restarted pt's Toprol XL 25mg po daily; later in the evening also started cardizem 30mg po q6h with hold parameters (will up titrate cardizem as needed and if tolerated by BP -- home dose of 360mg daily)  -continue to hold valsartan-HCTZ

## 2021-03-20 NOTE — CONSULT NOTE ADULT - ASSESSMENT
76 yo woman with history of gastritis and hiatal hernia s/p fuduplication surgery at [facility] in 7/2020, has been on oral iron supplements at home for over a year but presented with symptomatic anemia; noted to have guaiac + stools, equivocal iron stores with ferritin 95 and B12 deficiency    - to start PPI and in setting of hiatal hernia unclear if any iron absorption via GI tract  - agree to start IV iron Venofer 100mg daily X 3 doses  - if patient clinically stable for discharge tomorrow, okay to just give 2 doses  - also started on B12 1000mcg po daily  - patient to follow up with Dr. Ya (GI) as outpatient to discuss capsule endoscopy  - may also need to see surgeon (Dr. Desouza) as she is s/p fuduplication surgery  - patient can follow up in office for further treatment of iron deficiency  - tentatively planned to resume anticoagulation for A-fib; would monitor CBC closely  - follow CBC in a.m.  - case discussed with Dr. Martinez (hospitalist)
anemia  hiatal hernia  afib    s/p recent egd/colon  cont regular diet  will need outpatient capsule  if hgb improves in am then dc home with outpatient follow up with primary GI  no objection to resume xarelto

## 2021-03-20 NOTE — DISCHARGE NOTE PROVIDER - PROVIDER TOKENS
PROVIDER:[TOKEN:[[id number]],FOLLOWUP:[1 week],ESTABLISHEDPATIENT:[T]],PROVIDER:[TOKEN:[[id number]],FOLLOWUP:[1 week],ESTABLISHEDPATIENT:[T]],PROVIDER:[TOKEN:[[id number]],FOLLOWUP:[1 week],ESTABLISHEDPATIENT:[T]]

## 2021-03-20 NOTE — DISCHARGE NOTE PROVIDER - CARE PROVIDER_API CALL
MARLEE VILLEDA  [id number]  [address], [place] [address]  Phone: [phone]  Fax: [phone]  Established Patient  Follow Up Time: 1 week    Cliff Ya  GASTROENTEROLOGY  [address] [place] [address], [address]  [place] [address]  Phone: [phone]  Fax: [phone]  Established Patient  Follow Up Time: 1 week    Johnson Herbert  HEMATOLOGY  [address], [address]  [place] [address]  Phone: [phone]  Fax: [phone]  Established Patient  Follow Up Time: 1 week

## 2021-03-20 NOTE — DISCHARGE NOTE PROVIDER - NSDCCPCAREPLAN_GEN_ALL_CORE_FT
PRINCIPAL DISCHARGE DIAGNOSIS  Diagnosis: Anemia, unspecified type  Assessment and Plan of Treatment: You had anemia, you were suspected to have an upper gastrointestinal bleed. You were teated with oral iron and blood. The gastroenterologist Dr. Hooker saw you and recommended you get an outpatient capsule study. You were seen by hematologist Dr. Trevizo and started on ____.  -see hematologst Dr. trevizo within 1 week from discharge, Dr. Hooker within 1 week for setting up outpatient capsule study, and your primary care doctor within 1 week to monitor your hemoglobin and iron  -START protonix   -START oral iron pill everyday      SECONDARY DISCHARGE DIAGNOSES  Diagnosis: HTN (hypertension)  Assessment and Plan of Treatment: -you had low blood pressures when you first came to the hospital  -STOP taking your valsartan-HCTZ  -continue your cardizem, metoprolol succinate       Diagnosis: Atrial fibrillation  Assessment and Plan of Treatment: -continue your xarelto, cardizem, metoprolol succinate    Diagnosis: Orthostatic hypotension  Assessment and Plan of Treatment: -your Blood pressure medications were initially held in the hospital due to orthostatic hypotension 9low blood pressures when you change position)  -STOP taking your valsartan-HCTZ       PRINCIPAL DISCHARGE DIAGNOSIS  Diagnosis: Anemia, unspecified type  Assessment and Plan of Treatment: You had anemia, you were suspected to have an upper gastrointestinal bleed. You were teated with oral iron and blood. The gastroenterologist Dr. Hooker saw you and recommended you get an outpatient capsule study. You were seen by hematologist Dr. Trevizo and were given 2 doses of IV iron.  -see hematologst Dr. Treivzo within 1 week from discharge, your gastroenterologist within 1 week for setting up outpatient capsule study, and your primary care doctor within 1 week to monitor your hemoglobin and iron  -START protonix   -Continue oral iron pill everyday until reassessment from heme/onc and primary care physician      SECONDARY DISCHARGE DIAGNOSES  Diagnosis: HTN (hypertension)  Assessment and Plan of Treatment: -you had low blood pressures when you first came to the hospital  -STOP taking your valsartan-HCTZ  -CONTNUE your metoprolol succinate and cardizem as instructed      Diagnosis: Atrial fibrillation  Assessment and Plan of Treatment: -continue your xarelto, cardizem, metoprolol succinate    Diagnosis: Orthostatic hypotension  Assessment and Plan of Treatment: -Your Blood pressure medications were initially held in the hospital due to orthostatic hypotension (low blood pressures when you change position)  -STOP taking your valsartan-HCTZ       PRINCIPAL DISCHARGE DIAGNOSIS  Diagnosis: Anemia, unspecified type  Assessment and Plan of Treatment: You had anemia, you were suspected to have had slow blood loss in your gastrointestinal tract. You were teated with iron supplements and blood transfusions. The gastroenterologist Dr. Hooker saw you and recommended you get an outpatient capsule endoscopy study. Follow up with your gastroenterologist (Dr. Ya) within a week and make a plan for potential endoscopies. Take the prescribed pantoprazole for now twice a day and discuss with your gastroenterologist when you are able to decrease the dosing. He may choose to perform an upper endoscopy for you first. Discuss with your gastroenterologist whether you need to see your thoracic surgeon who repaired your hiatal hernia.  You were also seen by hematologist, Dr. Herbert, and were given 2 doses of IV iron. You might not be absorbing oral iron supplement well and you might need to have periodic iron injections with hematologist as an outpatient. Your B12 level was also low, so please take the B12 supplement as prescribed.  -Please follow up with Dr. Hrebert (number below) within 3 weeks from discharge, to reassess your anemia and your iron/B12 levels.  -Continue your oral iron supplement. If you have constipation, you may take the Miralax and/or senna as prescribed, as needed.  -If you have any lightheadedness, dizziness, chest pain, shortness of breath, nausea/vomiting, bloody stool, or significant change in the appearance of your stool, or other symptoms that concern you, call your doctor immediately or return to the ER.      SECONDARY DISCHARGE DIAGNOSES  Diagnosis: HTN (hypertension)  Assessment and Plan of Treatment: -you had low blood pressures when you first came to the hospital  -STOP taking your valsartan-hydrochlorothiazide combination pill  -CONTNUE your metoprolol succinate tablet.  -REPLACE your home dose of diltiazem (360mg daily) with the new tablet that is a lower dose (240mg daily) for now  -Monitor your blood pressure on a blood presure machine and make a daily log of BP readings to show your PMD or cardiologist in a week.   -discuss whether you need to make any changes, such as, increase back to your previous dose of cardizem or whether you need to restart valsartan based on your blood pressure log.      Diagnosis: Atrial fibrillation  Assessment and Plan of Treatment: -continue your Xarelto, and metoprolol succinate  -REPLACE your home dose of diltiazem (360mg daily) with the new tablet that is a lower dose (240mg daily) for now  -Monitor your blood pressure on a blood presure machine and make a daily log of BP readings to show your PMD or cardiologist in a week.   -discuss whether you need to make any changes, such as, increase back to your previous dose of cardizem or whether you need to restart valsartan based on your blood pressure log.     PRINCIPAL DISCHARGE DIAGNOSIS  Diagnosis: Anemia, unspecified type  Assessment and Plan of Treatment: You had symptomatic anemia causing your lightheadedness and near-fainting. You were suspected to have had slow blood loss in your gastrointestinal tract. Your fecal occult blood stool test was positive, but you did not have significant active bleeding while you were in the hospital. You were teated with iron supplements and blood transfusions. Your hemoglobin is now up to ~9.5. The gastroenterologist Dr. Hooker saw you and recommended you get an outpatient capsule endoscopy study. Follow up with your gastroenterologist (Dr. Ya) within a week and make a plan for potential endoscopies. Take the prescribed pantoprazole for now twice a day and discuss with your gastroenterologist when you are able to decrease the dosing. He may choose to perform an upper endoscopy for you first. Discuss with your gastroenterologist whether you need to see your thoracic surgeon who repaired your hiatal hernia. Do not take NSAIDS (medications like Advil, Aleve, ibuprofen, Motrin, naproxen) for pain. You may take Tylenol instead, which would not irritate your stomach lining.   You were also seen by hematologist, Dr. Herbert, and were given 2 doses of IV iron. You might not be absorbing oral iron supplement well and you might need to have periodic iron injections with hematologist as an outpatient. Your B12 level was also low, so please take the B12 supplement as prescribed.  -Please follow up with Dr. Herbert (number below) within 3 weeks from discharge, to reassess your anemia and your iron/B12 levels.  -Continue your oral iron supplement. If you have constipation, you may take the Miralax and/or senna as prescribed, as needed.  -If you have any lightheadedness, dizziness, chest pain, shortness of breath, nausea/vomiting, bloody stool, or significant change in the appearance of your stool, or other symptoms that concern you, call your doctor immediately or return to the ER.      SECONDARY DISCHARGE DIAGNOSES  Diagnosis: HTN (hypertension)  Assessment and Plan of Treatment: -you had low blood pressures when you first came to the hospital  -STOP taking your valsartan-hydrochlorothiazide combination pill  -CONTNUE your metoprolol succinate tablet.  -REPLACE your home dose of diltiazem (360mg daily) with the new tablet that is a lower dose (240mg daily) for now  -Monitor your blood pressure on a blood presure machine and make a daily log of BP readings to show your PMD or cardiologist in a week.   -discuss whether you need to make any changes, such as, increase back to your previous dose of cardizem or whether you need to restart valsartan based on your blood pressure log.      Diagnosis: Atrial fibrillation  Assessment and Plan of Treatment: -continue your Xarelto, and metoprolol succinate  -REPLACE your home dose of diltiazem (360mg daily) with the new tablet that is a lower dose (240mg daily) for now  -Monitor your blood pressure on a blood presure machine and make a daily log of BP readings to show your PMD or cardiologist in a week.   -discuss whether you need to make any changes, such as, increase back to your previous dose of cardizem or whether you need to restart valsartan based on your blood pressure log.

## 2021-03-20 NOTE — PROGRESS NOTE ADULT - SUBJECTIVE AND OBJECTIVE BOX
Patient is a 75y old  Female who presents with a chief complaint of Presyncope, anemia (20 Mar 2021 08:04)      INTERVAL HPI/OVERNIGHT EVENTS: No acute events overnight. Pt seen and examined at bedside. Pt states she stood up and walked to the bathroom multiple times yesterday without any lightheadedness or symptoms. Pt denies lightheadedness, vertigo, SOB, HSU, CP, palpitations. Pt states she continues to have     MEDICATIONS  (STANDING):  atorvastatin 10 milliGRAM(s) Oral at bedtime  cyanocobalamin 1000 MICROGram(s) Oral daily  diltiazem    Tablet 30 milliGRAM(s) Oral every 6 hours  ferrous    sulfate 325 milliGRAM(s) Oral daily  melatonin 5 milliGRAM(s) Oral at bedtime  metoprolol succinate ER 25 milliGRAM(s) Oral daily  pantoprazole  Injectable 40 milliGRAM(s) IV Push two times a day    MEDICATIONS  (PRN):      Allergies    doxycycline (Unknown)    Intolerances        REVIEW OF SYSTEMS:  CONSTITUTIONAL: No fever or chills  HEENT:  No headache, no sore throat  RESPIRATORY: No cough, wheezing, or shortness of breath  CARDIOVASCULAR: No chest pain, palpitations  GASTROINTESTINAL: +dark black stools, no hematochezia; No abd pain, nausea, vomiting, or diarrhea  GENITOURINARY: No dysuria, frequency, or hematuria  NEUROLOGICAL: no lightheadedness, vertigo, focal weakness   MUSCULOSKELETAL: no myalgias       Vital Signs Last 24 Hrs  T(C): 36.7 (20 Mar 2021 12:04), Max: 37.2 (19 Mar 2021 20:25)  T(F): 98.1 (20 Mar 2021 12:04), Max: 99 (19 Mar 2021 20:25)  HR: 87 (20 Mar 2021 12:04) (80 - 98)  BP: 103/68 (20 Mar 2021 12:04) (103/68 - 125/75)  BP(mean): --  RR: 18 (20 Mar 2021 12:04) (18 - 18)  SpO2: 95% (20 Mar 2021 12:04) (92% - 96%)    PHYSICAL EXAM:  GENERAL: NAD  HEENT:  anicteric, moist mucous membranes  CHEST/LUNG:  CTA b/l, no rales, wheezes, or rhonchi  HEART:  RRR, S1, S2  ABDOMEN:  BS+, soft, nontender, nondistended  EXTREMITIES: no edema, cyanosis, or calf tenderness  NERVOUS SYSTEM: answers questions and follows commands appropriately    LABS:                        8.1    4.67  )-----------( 281      ( 20 Mar 2021 09:49 )             26.9     CBC Full  -  ( 20 Mar 2021 09:49 )  WBC Count : 4.67 K/uL  Hemoglobin : 8.1 g/dL  Hematocrit : 26.9 %  Platelet Count - Automated : 281 K/uL  Mean Cell Volume : 92.4 fl  Mean Cell Hemoglobin : 27.8 pg  Mean Cell Hemoglobin Concentration : 30.1 gm/dL  Auto Neutrophil # : 2.75 K/uL  Auto Lymphocyte # : 1.33 K/uL  Auto Monocyte # : 0.40 K/uL  Auto Eosinophil # : 0.13 K/uL  Auto Basophil # : 0.04 K/uL  Auto Neutrophil % : 58.8 %  Auto Lymphocyte % : 28.5 %  Auto Monocyte % : 8.6 %  Auto Eosinophil % : 2.8 %  Auto Basophil % : 0.9 %    20 Mar 2021 09:49    143    |  109    |  16     ----------------------------<  95     4.4     |  30     |  0.91     Ca    9.6        20 Mar 2021 09:49    TPro  6.2    /  Alb  3.0    /  TBili  0.3    /  DBili  x      /  AST  11     /  ALT  11     /  AlkPhos  73     20 Mar 2021 09:49    PT/INR - ( 20 Mar 2021 09:49 )   PT: 13.5 sec;   INR: 1.16 ratio         PTT - ( 20 Mar 2021 09:49 )  PTT:28.7 sec  Urinalysis Basic - ( 18 Mar 2021 16:32 )    Color: Yellow / Appearance: Slightly Turbid / S.025 / pH: x  Gluc: x / Ketone: Trace  / Bili: Negative / Urobili: Negative   Blood: x / Protein: Negative / Nitrite: Negative   Leuk Esterase: Negative / RBC: x / WBC 0-2   Sq Epi: x / Non Sq Epi: Moderate / Bacteria: Occasional      CAPILLARY BLOOD GLUCOSE            Culture - Urine (collected 21 @ 22:02)  Source: .Urine Clean Catch (Midstream)  Final Report (21 @ 19:07):    <10,000 CFU/mL Normal Urogenital Josefa        RADIOLOGY & ADDITIONAL TESTS:    Personally reviewed.     Consultant(s) Notes Reviewed:  [x] YES  [ ] NO     Patient is a 75y old  Female who presents with a chief complaint of Presyncope, anemia (20 Mar 2021 08:04)      INTERVAL HPI/OVERNIGHT EVENTS: Overnight pt had rapid a fib rate 140s, started on Cardizem and toprol, rate improved to 80s this AM. Pt seen and examined at bedside. Pt states she stood up and walked to the bathroom multiple times yesterday without any lightheadedness or symptoms. Pt denies lightheadedness, vertigo, SOB, HSU, CP, palpitations. Pt states she chronically has dark black stools.     MEDICATIONS  (STANDING):  atorvastatin 10 milliGRAM(s) Oral at bedtime  cyanocobalamin 1000 MICROGram(s) Oral daily  diltiazem    Tablet 30 milliGRAM(s) Oral every 6 hours  ferrous    sulfate 325 milliGRAM(s) Oral daily  melatonin 5 milliGRAM(s) Oral at bedtime  metoprolol succinate ER 25 milliGRAM(s) Oral daily  pantoprazole  Injectable 40 milliGRAM(s) IV Push two times a day    MEDICATIONS  (PRN):      Allergies    doxycycline (Unknown)    Intolerances        REVIEW OF SYSTEMS:  CONSTITUTIONAL: No fever or chills  HEENT:  No headache, no sore throat  RESPIRATORY: No cough, wheezing, or shortness of breath  CARDIOVASCULAR: No chest pain, palpitations  GASTROINTESTINAL: +dark black stools, no hematochezia; No abd pain, nausea, vomiting, or diarrhea  GENITOURINARY: No dysuria, frequency, or hematuria  NEUROLOGICAL: no lightheadedness, vertigo, focal weakness   MUSCULOSKELETAL: no myalgias       Vital Signs Last 24 Hrs  T(C): 36.7 (20 Mar 2021 12:04), Max: 37.2 (19 Mar 2021 20:25)  T(F): 98.1 (20 Mar 2021 12:04), Max: 99 (19 Mar 2021 20:25)  HR: 87 (20 Mar 2021 12:04) (80 - 98)  BP: 103/68 (20 Mar 2021 12:04) (103/68 - 125/75)  BP(mean): --  RR: 18 (20 Mar 2021 12:04) (18 - 18)  SpO2: 95% (20 Mar 2021 12:04) (92% - 96%)    PHYSICAL EXAM:  GENERAL: NAD, sitting upright in bedside chair   HEENT:  anicteric, moist mucous membranes  CHEST/LUNG:  CTA b/l, no rales, wheezes, or rhonchi  HEART:  RRR, S1, S2  ABDOMEN:  BS+, soft, nontender, nondistended  EXTREMITIES: trace b/l LE edema R>L, cyanosis, or calf tenderness  NERVOUS SYSTEM: answers questions and follows commands appropriately    LABS:                        8.1    4.67  )-----------( 281      ( 20 Mar 2021 09:49 )             26.9     CBC Full  -  ( 20 Mar 2021 09:49 )  WBC Count : 4.67 K/uL  Hemoglobin : 8.1 g/dL  Hematocrit : 26.9 %  Platelet Count - Automated : 281 K/uL  Mean Cell Volume : 92.4 fl  Mean Cell Hemoglobin : 27.8 pg  Mean Cell Hemoglobin Concentration : 30.1 gm/dL  Auto Neutrophil # : 2.75 K/uL  Auto Lymphocyte # : 1.33 K/uL  Auto Monocyte # : 0.40 K/uL  Auto Eosinophil # : 0.13 K/uL  Auto Basophil # : 0.04 K/uL  Auto Neutrophil % : 58.8 %  Auto Lymphocyte % : 28.5 %  Auto Monocyte % : 8.6 %  Auto Eosinophil % : 2.8 %  Auto Basophil % : 0.9 %    20 Mar 2021 09:49    143    |  109    |  16     ----------------------------<  95     4.4     |  30     |  0.91     Ca    9.6        20 Mar 2021 09:49    TPro  6.2    /  Alb  3.0    /  TBili  0.3    /  DBili  x      /  AST  11     /  ALT  11     /  AlkPhos  73     20 Mar 2021 09:49    PT/INR - ( 20 Mar 2021 09:49 )   PT: 13.5 sec;   INR: 1.16 ratio         PTT - ( 20 Mar 2021 09:49 )  PTT:28.7 sec  Urinalysis Basic - ( 18 Mar 2021 16:32 )    Color: Yellow / Appearance: Slightly Turbid / S.025 / pH: x  Gluc: x / Ketone: Trace  / Bili: Negative / Urobili: Negative   Blood: x / Protein: Negative / Nitrite: Negative   Leuk Esterase: Negative / RBC: x / WBC 0-2   Sq Epi: x / Non Sq Epi: Moderate / Bacteria: Occasional      CAPILLARY BLOOD GLUCOSE            Culture - Urine (collected 21 @ 22:02)  Source: .Urine Clean Catch (Midstream)  Final Report (21 @ 19:07):    <10,000 CFU/mL Normal Urogenital Josefa        RADIOLOGY & ADDITIONAL TESTS:    Personally reviewed.     Consultant(s) Notes Reviewed:  [x] YES  [ ] NO     Patient is a 75y old  Female who presents with a chief complaint of Presyncope.      INTERVAL HPI/OVERNIGHT EVENTS: Yesterday evening pt had rapid a fib peaking at 140s, started on Cardizem and Toprol, rate improved to 80s this AM. Pt seen and examined at bedside. Pt states she stood up and walked to the bathroom multiple times yesterday without any lightheadedness or symptoms. Pt denies lightheadedness, vertigo, SOB, HSU, CP, palpitations. Pt states she chronically has dark black stools.     MEDICATIONS  (STANDING):  atorvastatin 10 milliGRAM(s) Oral at bedtime  cyanocobalamin 1000 MICROGram(s) Oral daily  diltiazem    Tablet 30 milliGRAM(s) Oral every 6 hours  ferrous    sulfate 325 milliGRAM(s) Oral daily  melatonin 5 milliGRAM(s) Oral at bedtime  metoprolol succinate ER 25 milliGRAM(s) Oral daily  pantoprazole  Injectable 40 milliGRAM(s) IV Push two times a day    MEDICATIONS  (PRN):      Allergies    doxycycline (Unknown)    Intolerances        REVIEW OF SYSTEMS:  CONSTITUTIONAL: No fever or chills  HEENT:  No headache, no sore throat  RESPIRATORY: No cough, wheezing, or shortness of breath  CARDIOVASCULAR: No chest pain, palpitations  GASTROINTESTINAL: +dark black stools, no hematochezia; No abd pain, nausea, vomiting, or diarrhea  GENITOURINARY: No dysuria, frequency, or hematuria  NEUROLOGICAL: no lightheadedness, vertigo, focal weakness   MUSCULOSKELETAL: no myalgias       Vital Signs Last 24 Hrs  T(C): 36.7 (20 Mar 2021 12:04), Max: 37.2 (19 Mar 2021 20:25)  T(F): 98.1 (20 Mar 2021 12:04), Max: 99 (19 Mar 2021 20:25)  HR: 87 (20 Mar 2021 12:04) (80 - 98)  BP: 103/68 (20 Mar 2021 12:04) (103/68 - 125/75)  BP(mean): --  RR: 18 (20 Mar 2021 12:04) (18 - 18)  SpO2: 95% (20 Mar 2021 12:04) (92% - 96%)    PHYSICAL EXAM:  GENERAL: NAD, sitting upright in bedside chair   HEENT:  anicteric, moist mucous membranes  CHEST/LUNG:  CTA b/l, no rales, wheezes, or rhonchi  HEART:  RRR, S1, S2  ABDOMEN:  BS+, soft, nontender, nondistended  EXTREMITIES: trace b/l LE edema R>L, cyanosis, or calf tenderness  NERVOUS SYSTEM: answers questions and follows commands appropriately    LABS:                        8.1    4.67  )-----------( 281      ( 20 Mar 2021 09:49 )             26.9     CBC Full  -  ( 20 Mar 2021 09:49 )  WBC Count : 4.67 K/uL  Hemoglobin : 8.1 g/dL  Hematocrit : 26.9 %  Platelet Count - Automated : 281 K/uL  Mean Cell Volume : 92.4 fl  Mean Cell Hemoglobin : 27.8 pg  Mean Cell Hemoglobin Concentration : 30.1 gm/dL  Auto Neutrophil # : 2.75 K/uL  Auto Lymphocyte # : 1.33 K/uL  Auto Monocyte # : 0.40 K/uL  Auto Eosinophil # : 0.13 K/uL  Auto Basophil # : 0.04 K/uL  Auto Neutrophil % : 58.8 %  Auto Lymphocyte % : 28.5 %  Auto Monocyte % : 8.6 %  Auto Eosinophil % : 2.8 %  Auto Basophil % : 0.9 %    20 Mar 2021 09:49    143    |  109    |  16     ----------------------------<  95     4.4     |  30     |  0.91     Ca    9.6        20 Mar 2021 09:49    TPro  6.2    /  Alb  3.0    /  TBili  0.3    /  DBili  x      /  AST  11     /  ALT  11     /  AlkPhos  73     20 Mar 2021 09:49    PT/INR - ( 20 Mar 2021 09:49 )   PT: 13.5 sec;   INR: 1.16 ratio         PTT - ( 20 Mar 2021 09:49 )  PTT:28.7 sec  Urinalysis Basic - ( 18 Mar 2021 16:32 )    Color: Yellow / Appearance: Slightly Turbid / S.025 / pH: x  Gluc: x / Ketone: Trace  / Bili: Negative / Urobili: Negative   Blood: x / Protein: Negative / Nitrite: Negative   Leuk Esterase: Negative / RBC: x / WBC 0-2   Sq Epi: x / Non Sq Epi: Moderate / Bacteria: Occasional      CAPILLARY BLOOD GLUCOSE            Culture - Urine (collected 21 @ 22:02)  Source: .Urine Clean Catch (Midstream)  Final Report (21 @ 19:07):    <10,000 CFU/mL Normal Urogenital Josefa        RADIOLOGY & ADDITIONAL TESTS:    Personally reviewed.     Consultant(s) Notes Reviewed:  [x] YES  [ ] NO

## 2021-03-21 ENCOUNTER — TRANSCRIPTION ENCOUNTER (OUTPATIENT)
Age: 76
End: 2021-03-21

## 2021-03-21 VITALS
OXYGEN SATURATION: 95 % | HEART RATE: 93 BPM | RESPIRATION RATE: 18 BRPM | SYSTOLIC BLOOD PRESSURE: 112 MMHG | DIASTOLIC BLOOD PRESSURE: 71 MMHG

## 2021-03-21 LAB
ALBUMIN SERPL ELPH-MCNC: 3 G/DL — LOW (ref 3.3–5)
ALP SERPL-CCNC: 74 U/L — SIGNIFICANT CHANGE UP (ref 40–120)
ALT FLD-CCNC: 12 U/L — SIGNIFICANT CHANGE UP (ref 12–78)
ANION GAP SERPL CALC-SCNC: 4 MMOL/L — LOW (ref 5–17)
AST SERPL-CCNC: 14 U/L — LOW (ref 15–37)
BASOPHILS # BLD AUTO: 0.03 K/UL — SIGNIFICANT CHANGE UP (ref 0–0.2)
BASOPHILS NFR BLD AUTO: 0.6 % — SIGNIFICANT CHANGE UP (ref 0–2)
BILIRUB SERPL-MCNC: 0.3 MG/DL — SIGNIFICANT CHANGE UP (ref 0.2–1.2)
BUN SERPL-MCNC: 16 MG/DL — SIGNIFICANT CHANGE UP (ref 7–23)
CALCIUM SERPL-MCNC: 9 MG/DL — SIGNIFICANT CHANGE UP (ref 8.5–10.1)
CHLORIDE SERPL-SCNC: 111 MMOL/L — HIGH (ref 96–108)
CO2 SERPL-SCNC: 29 MMOL/L — SIGNIFICANT CHANGE UP (ref 22–31)
CREAT SERPL-MCNC: 0.99 MG/DL — SIGNIFICANT CHANGE UP (ref 0.5–1.3)
EOSINOPHIL # BLD AUTO: 0.16 K/UL — SIGNIFICANT CHANGE UP (ref 0–0.5)
EOSINOPHIL NFR BLD AUTO: 3.3 % — SIGNIFICANT CHANGE UP (ref 0–6)
GLUCOSE SERPL-MCNC: 90 MG/DL — SIGNIFICANT CHANGE UP (ref 70–99)
HCT VFR BLD CALC: 28.6 % — LOW (ref 34.5–45)
HCT VFR BLD CALC: 31.1 % — LOW (ref 34.5–45)
HGB BLD-MCNC: 9.1 G/DL — LOW (ref 11.5–15.5)
HGB BLD-MCNC: 9.8 G/DL — LOW (ref 11.5–15.5)
IMM GRANULOCYTES NFR BLD AUTO: 0.8 % — SIGNIFICANT CHANGE UP (ref 0–1.5)
LYMPHOCYTES # BLD AUTO: 1.22 K/UL — SIGNIFICANT CHANGE UP (ref 1–3.3)
LYMPHOCYTES # BLD AUTO: 24.9 % — SIGNIFICANT CHANGE UP (ref 13–44)
MCHC RBC-ENTMCNC: 28.6 PG — SIGNIFICANT CHANGE UP (ref 27–34)
MCHC RBC-ENTMCNC: 31.8 GM/DL — LOW (ref 32–36)
MCV RBC AUTO: 89.9 FL — SIGNIFICANT CHANGE UP (ref 80–100)
MONOCYTES # BLD AUTO: 0.47 K/UL — SIGNIFICANT CHANGE UP (ref 0–0.9)
MONOCYTES NFR BLD AUTO: 9.6 % — SIGNIFICANT CHANGE UP (ref 2–14)
NEUTROPHILS # BLD AUTO: 2.98 K/UL — SIGNIFICANT CHANGE UP (ref 1.8–7.4)
NEUTROPHILS NFR BLD AUTO: 60.8 % — SIGNIFICANT CHANGE UP (ref 43–77)
NRBC # BLD: 0 /100 WBCS — SIGNIFICANT CHANGE UP (ref 0–0)
PLATELET # BLD AUTO: 266 K/UL — SIGNIFICANT CHANGE UP (ref 150–400)
POTASSIUM SERPL-MCNC: 4.3 MMOL/L — SIGNIFICANT CHANGE UP (ref 3.5–5.3)
POTASSIUM SERPL-SCNC: 4.3 MMOL/L — SIGNIFICANT CHANGE UP (ref 3.5–5.3)
PROT SERPL-MCNC: 5.8 G/DL — LOW (ref 6–8.3)
RBC # BLD: 3.18 M/UL — LOW (ref 3.8–5.2)
RBC # FLD: 19.8 % — HIGH (ref 10.3–14.5)
SODIUM SERPL-SCNC: 144 MMOL/L — SIGNIFICANT CHANGE UP (ref 135–145)
WBC # BLD: 4.9 K/UL — SIGNIFICANT CHANGE UP (ref 3.8–10.5)
WBC # FLD AUTO: 4.9 K/UL — SIGNIFICANT CHANGE UP (ref 3.8–10.5)

## 2021-03-21 PROCEDURE — 82607 VITAMIN B-12: CPT

## 2021-03-21 PROCEDURE — 99285 EMERGENCY DEPT VISIT HI MDM: CPT

## 2021-03-21 PROCEDURE — 85610 PROTHROMBIN TIME: CPT

## 2021-03-21 PROCEDURE — 82272 OCCULT BLD FECES 1-3 TESTS: CPT

## 2021-03-21 PROCEDURE — 85014 HEMATOCRIT: CPT

## 2021-03-21 PROCEDURE — 85018 HEMOGLOBIN: CPT

## 2021-03-21 PROCEDURE — U0005: CPT

## 2021-03-21 PROCEDURE — 83735 ASSAY OF MAGNESIUM: CPT

## 2021-03-21 PROCEDURE — 81001 URINALYSIS AUTO W/SCOPE: CPT

## 2021-03-21 PROCEDURE — 85730 THROMBOPLASTIN TIME PARTIAL: CPT

## 2021-03-21 PROCEDURE — 82747 ASSAY OF FOLIC ACID RBC: CPT

## 2021-03-21 PROCEDURE — 93005 ELECTROCARDIOGRAM TRACING: CPT

## 2021-03-21 PROCEDURE — 83690 ASSAY OF LIPASE: CPT

## 2021-03-21 PROCEDURE — 86900 BLOOD TYPING SEROLOGIC ABO: CPT

## 2021-03-21 PROCEDURE — 84443 ASSAY THYROID STIM HORMONE: CPT

## 2021-03-21 PROCEDURE — 86923 COMPATIBILITY TEST ELECTRIC: CPT

## 2021-03-21 PROCEDURE — 82728 ASSAY OF FERRITIN: CPT

## 2021-03-21 PROCEDURE — 36415 COLL VENOUS BLD VENIPUNCTURE: CPT

## 2021-03-21 PROCEDURE — 86901 BLOOD TYPING SEROLOGIC RH(D): CPT

## 2021-03-21 PROCEDURE — 36430 TRANSFUSION BLD/BLD COMPNT: CPT

## 2021-03-21 PROCEDURE — 85025 COMPLETE CBC W/AUTO DIFF WBC: CPT

## 2021-03-21 PROCEDURE — 84100 ASSAY OF PHOSPHORUS: CPT

## 2021-03-21 PROCEDURE — 80053 COMPREHEN METABOLIC PANEL: CPT

## 2021-03-21 PROCEDURE — 71045 X-RAY EXAM CHEST 1 VIEW: CPT

## 2021-03-21 PROCEDURE — U0003: CPT

## 2021-03-21 PROCEDURE — 99239 HOSP IP/OBS DSCHRG MGMT >30: CPT

## 2021-03-21 PROCEDURE — 84484 ASSAY OF TROPONIN QUANT: CPT

## 2021-03-21 PROCEDURE — 87086 URINE CULTURE/COLONY COUNT: CPT

## 2021-03-21 PROCEDURE — P9016: CPT

## 2021-03-21 PROCEDURE — 83880 ASSAY OF NATRIURETIC PEPTIDE: CPT

## 2021-03-21 PROCEDURE — 86850 RBC ANTIBODY SCREEN: CPT

## 2021-03-21 PROCEDURE — 83540 ASSAY OF IRON: CPT

## 2021-03-21 PROCEDURE — 83550 IRON BINDING TEST: CPT

## 2021-03-21 PROCEDURE — 82553 CREATINE MB FRACTION: CPT

## 2021-03-21 PROCEDURE — 86769 SARS-COV-2 COVID-19 ANTIBODY: CPT

## 2021-03-21 PROCEDURE — 93306 TTE W/DOPPLER COMPLETE: CPT

## 2021-03-21 RX ORDER — PREGABALIN 225 MG/1
1 CAPSULE ORAL
Qty: 30 | Refills: 0
Start: 2021-03-21 | End: 2021-04-19

## 2021-03-21 RX ORDER — DILTIAZEM HCL 120 MG
1 CAPSULE, EXT RELEASE 24 HR ORAL
Qty: 30 | Refills: 0
Start: 2021-03-21 | End: 2021-04-19

## 2021-03-21 RX ORDER — DILTIAZEM HCL 120 MG
1 CAPSULE, EXT RELEASE 24 HR ORAL
Qty: 0 | Refills: 0 | DISCHARGE

## 2021-03-21 RX ORDER — DILTIAZEM HCL 120 MG
60 CAPSULE, EXT RELEASE 24 HR ORAL EVERY 6 HOURS
Refills: 0 | Status: DISCONTINUED | OUTPATIENT
Start: 2021-03-21 | End: 2021-03-21

## 2021-03-21 RX ORDER — SENNA PLUS 8.6 MG/1
2 TABLET ORAL
Qty: 60 | Refills: 0
Start: 2021-03-21 | End: 2021-04-19

## 2021-03-21 RX ORDER — PREGABALIN 225 MG/1
1 CAPSULE ORAL
Qty: 0 | Refills: 0 | DISCHARGE
Start: 2021-03-21

## 2021-03-21 RX ORDER — POLYETHYLENE GLYCOL 3350 17 G/17G
17 POWDER, FOR SOLUTION ORAL
Qty: 238 | Refills: 0
Start: 2021-03-21 | End: 2021-04-03

## 2021-03-21 RX ORDER — PANTOPRAZOLE SODIUM 20 MG/1
1 TABLET, DELAYED RELEASE ORAL
Qty: 60 | Refills: 0
Start: 2021-03-21 | End: 2021-04-19

## 2021-03-21 RX ADMIN — PANTOPRAZOLE SODIUM 40 MILLIGRAM(S): 20 TABLET, DELAYED RELEASE ORAL at 06:25

## 2021-03-21 RX ADMIN — Medication 25 MILLIGRAM(S): at 06:25

## 2021-03-21 RX ADMIN — PREGABALIN 1000 MICROGRAM(S): 225 CAPSULE ORAL at 12:08

## 2021-03-21 RX ADMIN — Medication 325 MILLIGRAM(S): at 12:08

## 2021-03-21 RX ADMIN — Medication 60 MILLIGRAM(S): at 12:08

## 2021-03-21 RX ADMIN — Medication 30 MILLIGRAM(S): at 06:25

## 2021-03-21 RX ADMIN — PANTOPRAZOLE SODIUM 40 MILLIGRAM(S): 20 TABLET, DELAYED RELEASE ORAL at 17:35

## 2021-03-21 RX ADMIN — Medication 60 MILLIGRAM(S): at 17:36

## 2021-03-21 RX ADMIN — RIVAROXABAN 20 MILLIGRAM(S): KIT at 17:35

## 2021-03-21 RX ADMIN — IRON SUCROSE 100 MILLIGRAM(S): 20 INJECTION, SOLUTION INTRAVENOUS at 12:09

## 2021-03-21 NOTE — PROGRESS NOTE ADULT - SUBJECTIVE AND OBJECTIVE BOX
INTERVAL HPI/OVERNIGHT EVENTS:  pt seen and examined  denies n//v/abd pain/gib  states no bm yesterday or overnight  tolerating diet  sp 1u prbc yesterday, hgb  trend noted, back on ac    MEDICATIONS  (STANDING):  atorvastatin 10 milliGRAM(s) Oral at bedtime  cyanocobalamin 1000 MICROGram(s) Oral daily  diltiazem    Tablet 30 milliGRAM(s) Oral every 6 hours  ferrous    sulfate 325 milliGRAM(s) Oral daily  iron sucrose Injectable 100 milliGRAM(s) IV Push every 24 hours  melatonin 5 milliGRAM(s) Oral at bedtime  metoprolol succinate ER 25 milliGRAM(s) Oral daily  pantoprazole    Tablet 40 milliGRAM(s) Oral two times a day  rivaroxaban 20 milliGRAM(s) Oral with dinner    MEDICATIONS  (PRN):  acetaminophen   Tablet .. 650 milliGRAM(s) Oral every 6 hours PRN Mild Pain (1 - 3)      Allergies    doxycycline (Unknown)    Intolerances        Review of Systems:    General:  No wt loss, fevers, chills, night sweats, fatigue   Eyes:  Good vision, no reported pain  ENT:  No sore throat, pain, runny nose, dysphagia  CV:  No pain, palpitations, hypo/hypertension  Resp:  No dyspnea, cough, tachypnea, wheezing  GI:  see above   :  No pain, bleeding, incontinence, nocturia  Muscle:  No pain, weakness  Neuro:  No weakness, tingling, memory problems  Psych:  No fatigue, insomnia, mood problems, depression  Endocrine:  No polyuria, polydypsia, cold/heat intolerance  Heme:  No petechiae, ecchymosis, easy bruisability  Skin:  No rash, tattoos, scars, edema      Vital Signs Last 24 Hrs  T(C): 37 (21 Mar 2021 05:11), Max: 37 (21 Mar 2021 05:11)  T(F): 98.6 (21 Mar 2021 05:11), Max: 98.6 (21 Mar 2021 05:11)  HR: 99 (21 Mar 2021 05:11) (80 - 107)  BP: 111/75 (21 Mar 2021 05:11) (103/68 - 129/84)  BP(mean): --  RR: 18 (21 Mar 2021 05:11) (18 - 18)  SpO2: 96% (21 Mar 2021 05:11) (91% - 96%)    PHYSICAL EXAM:    Constitutional: lying in bed  HEENT: ncat  Gastrointestinal: soft nt nd  Extremities: No peripheral edema  Vascular: + peripheral pulses  Neurological: Awake alert appropriate    LABS:                        9.1    4.90  )-----------( 266      ( 21 Mar 2021 06:36 )             28.6     03-21    144  |  111<H>  |  16  ----------------------------<  90  4.3   |  29  |  0.99    Ca    9.0      21 Mar 2021 06:36    TPro  5.8<L>  /  Alb  3.0<L>  /  TBili  0.3  /  DBili  x   /  AST  14<L>  /  ALT  12  /  AlkPhos  74  03-21    PT/INR - ( 20 Mar 2021 09:49 )   PT: 13.5 sec;   INR: 1.16 ratio         PTT - ( 20 Mar 2021 09:49 )  PTT:28.7 sec      RADIOLOGY & ADDITIONAL TESTS:

## 2021-03-21 NOTE — PROGRESS NOTE ADULT - SUBJECTIVE AND OBJECTIVE BOX
Patient seen and examined;  Chart reviewed and events noted;   feels well; has resumed Xarelto; has not had any BMs for about 2 days    MEDICATIONS  (STANDING):  atorvastatin 10 milliGRAM(s) Oral at bedtime  cyanocobalamin 1000 MICROGram(s) Oral daily  diltiazem    Tablet 30 milliGRAM(s) Oral every 6 hours  ferrous    sulfate 325 milliGRAM(s) Oral daily  iron sucrose Injectable 100 milliGRAM(s) IV Push every 24 hours  melatonin 5 milliGRAM(s) Oral at bedtime  metoprolol succinate ER 25 milliGRAM(s) Oral daily  pantoprazole    Tablet 40 milliGRAM(s) Oral two times a day  rivaroxaban 20 milliGRAM(s) Oral with dinner    MEDICATIONS  (PRN):  acetaminophen   Tablet .. 650 milliGRAM(s) Oral every 6 hours PRN Mild Pain (1 - 3)      Vital Signs Last 24 Hrs  T(C): 37 (21 Mar 2021 05:11), Max: 37 (21 Mar 2021 05:11)  T(F): 98.6 (21 Mar 2021 05:11), Max: 98.6 (21 Mar 2021 05:11)  HR: 99 (21 Mar 2021 05:11) (80 - 107)  BP: 111/75 (21 Mar 2021 05:11) (103/68 - 129/84)  RR: 18 (21 Mar 2021 05:11) (18 - 18)  SpO2: 96% (21 Mar 2021 05:11) (91% - 96%)    PHYSICAL EXAM  General: adult in NAD  HEENT: clear oropharynx, anicteric sclera, pink conjunctivae  Neck: supple  CV: normal S1S2 with no murmur rubs or gallops  Lungs: clear to auscultation, no wheezes, no rhales  Abdomen: soft non-tender non-distended, no hepato/splenomegaly  Ext: no clubbing cyanosis or edema  Skin: no rashes and no petichiae  Neuro: alert and oriented X3 no focal deficits      LABS:                        9.1    4.90  )-----------( 266      ( 21 Mar 2021 06:36 )             28.6     Hemoglobin: 9.1 g/dL (03-21 @ 06:36)  Hemoglobin: 10.2 g/dL (03-20 @ 17:10)  Hemoglobin: 8.1 g/dL (03-20 @ 09:49)  Hemoglobin: 8.3 g/dL (03-19 @ 14:06)  Hemoglobin: 8.1 g/dL (03-19 @ 07:16)    03-21    144  |  111<H>  |  16  ----------------------------<  90  4.3   |  29  |  0.99    Ca    9.0      21 Mar 2021 06:36    TPro  5.8<L>  /  Alb  3.0<L>  /  TBili  0.3  /  DBili  x   /  AST  14<L>  /  ALT  12  /  AlkPhos  74  03-21    PT/INR - ( 20 Mar 2021 09:49 )   PT: 13.5 sec;   INR: 1.16 ratio    PTT - ( 20 Mar 2021 09:49 )  PTT:28.7 sec

## 2021-03-21 NOTE — DISCHARGE NOTE NURSING/CASE MANAGEMENT/SOCIAL WORK - PATIENT PORTAL LINK FT
You can access the FollowMyHealth Patient Portal offered by [facility] by registering at the following website: [email or web address]. By joining [facility]’s FollowMyHealth portal, you will also be able to view your health information using other applications (apps) compatible with our system.

## 2021-03-21 NOTE — PROGRESS NOTE ADULT - ATTENDING COMMENTS
Advanced care planning was discussed with patient and family.  Advanced care planning forms were reviewed and discussed.  Risks, benefits and alternatives of gastroenterologic procedures were discussed in detail and all questions were answered.    30 minutes spent.
Advanced care planning was discussed with patient and family.  Advanced care planning forms were reviewed and discussed.  Risks, benefits and alternatives of gastroenterologic procedures were discussed in detail and all questions were answered.    30 minutes spent.
Pt seen + examined. Case discussed with resident. Agree with assessment and plan above (edited by me in detail):  Time spent: 40min. More than 50% of the visit was spent counseling the patient on medical condition -- symptomatic anemia, +FOBT, ?melena, GIB w/up, afib with RVR.
Pt seen + examined. Case discussed with resident. Agree with assessment and plan above (edited by me in detail):  Time spent: 45min. More than 50% of the visit was spent counseling the patient on medical condition -- symptomatic anemia, +FOBT, ?melena, GIB w/up, afib with RVR.

## 2021-03-21 NOTE — PROGRESS NOTE ADULT - SUBJECTIVE AND OBJECTIVE BOX
CHIEF COMPLAINT: Patient is a 75y old  Female who presents with a chief complaint of Presyncope, anemia (21 Mar 2021 10:08)      Follow Up: [ ] Chest Pain      [ ] Dyspnea     [ ] Palpitations    [ x] Atrial Fibrillation     [ ] Ventricular Dysrhythmia    [ ] Abnormal EKG                      [ ] Abnormal Cardiac Enzymes     [ ] Valvular Disease   [x ] CAD  [ ] Hypertension [ ] CHF      HPI:  Pt is a 76 yo F with a PMHx of HTN, HLD, afib on xarelto s/p ppm in 10/20, hiatal hernia s/p repair in 7/20, chronic iron deficiency anemia, CAD no stents, CKD, thoracic aortic aneurysm who presents with lightheadedness. Pt says on Monday when standing from a seated position she started to feel lightheadedness and like she was going to pass out. After experiencing this, she took an iron tablet for which she previously stopped taking due to constipation. Of note, pt prescribed iron tablets for chronic iron deficiency anemia of unknown source. Her hiatal hernia was discovered incidentally after upper and lower EGD which was performed for anemia workup and was repaired in 7/2020. She says her symptoms persisted throughout the week which prompted her to follow up with her cardiologist today Dr. Nesbitt. In the office she was pale with orthostatic hypotension which was documented in the office today and was told to come to the ed for admission. She denies lightheadedness at rest but says she feels like she will pass out when standing. She denies dizziness, headaches, chest pain, difficulty breathing, abdominal pain, n/v/d/c, melena or hematochezia. Last colonoscopy 1 years ago per pt and was "normal", done by Dr Ya. Of note pt says she lost considerable amount of weight since her hiatal hernia repair 2/2 to decreased appetite/loss of desire to eat red meat.    In the ED:   VS: BP 99/57, R 16, T 98, Spo2 98 Hr 74  Labs signifcant H/H 7.8/26.0 wbc 7.80, K 3.3, BUN/Cr: 31/1.20, BNP 1084, trops negative x 1  CXR   IMPRESSION: Basilar linear density slight in degree improved from prior.  EKG afib, no acute signs of ischemia     s/p 1 unit IV NS, 1 unit PRBC  (18 Mar 2021 14:08)      PAST MEDICAL & SURGICAL HISTORY:  Afib    Thoracic aortic aneurysm  (Stable at 4.1cm since 2012)    Hypertension    Afib    History of colonoscopy  (2014)    S/P hysterectomy with oophorectomy  (Age 45)    S/P hip replacement  (Left hip, 2010)        MEDICATIONS  (STANDING):  atorvastatin 10 milliGRAM(s) Oral at bedtime  cyanocobalamin 1000 MICROGram(s) Oral daily  diltiazem    Tablet 60 milliGRAM(s) Oral every 6 hours  ferrous    sulfate 325 milliGRAM(s) Oral daily  iron sucrose Injectable 100 milliGRAM(s) IV Push every 24 hours  melatonin 5 milliGRAM(s) Oral at bedtime  metoprolol succinate ER 25 milliGRAM(s) Oral daily  pantoprazole    Tablet 40 milliGRAM(s) Oral two times a day  rivaroxaban 20 milliGRAM(s) Oral with dinner    MEDICATIONS  (PRN):  acetaminophen   Tablet .. 650 milliGRAM(s) Oral every 6 hours PRN Mild Pain (1 - 3)      Allergies    doxycycline (Unknown)    Intolerances        REVIEW OF SYSTEMS:    CONSTITUTIONAL: No weakness, fevers or chills.     RESPIRATORY: No cough, wheezing, hemoptysis; No shortness of breath  CARDIOVASCULAR: No chest pain or palpitations  GASTROINTESTINAL: No abdominal or epigastric pain. No nausea, vomiting, or hematemesis; No diarrhea or constipation. No melena or hematochezia.      Vital Signs Last 24 Hrs  T(C): 37 (21 Mar 2021 05:11), Max: 37 (21 Mar 2021 05:11)  T(F): 98.6 (21 Mar 2021 05:11), Max: 98.6 (21 Mar 2021 05:11)  HR: 99 (21 Mar 2021 05:11) (83 - 107)  BP: 111/75 (21 Mar 2021 05:11) (103/68 - 129/84)  BP(mean): --  RR: 18 (21 Mar 2021 05:11) (18 - 18)  SpO2: 96% (21 Mar 2021 05:11) (91% - 96%)    I&O's Summary      PHYSICAL EXAM:    Constitutional: NAD, awake and alert, well-developed  unds are clear bilaterally, No wheezing, rales or rhonchi  Cardiovascular: irregular, S1 and S2, No murmurs, rubs, gallops or clicks    Extremities: No lower extremity clubbing cyanosis or edema  N    LABS: All Labs Reviewed:                        9.1    4.90  )-----------( 266      ( 21 Mar 2021 06:36 )             28.6                         10.2   x     )-----------( x        ( 20 Mar 2021 17:10 )             32.2                         8.1    4.67  )-----------( 281      ( 20 Mar 2021 09:49 )             26.9     21 Mar 2021 06:36    144    |  111    |  16     ----------------------------<  90     4.3     |  29     |  0.99   20 Mar 2021 09:49    143    |  109    |  16     ----------------------------<  95     4.4     |  30     |  0.91   19 Mar 2021 07:16    141    |  108    |  21     ----------------------------<  96     3.9     |  27     |  0.92     Ca    9.0        21 Mar 2021 06:36  Ca    9.6        20 Mar 2021 09:49  Ca    8.8        19 Mar 2021 07:16  Phos  2.6       19 Mar 2021 07:16  Mg     2.1       19 Mar 2021 07:16    TPro  5.8    /  Alb  3.0    /  TBili  0.3    /  DBili  x      /  AST  14     /  ALT  12     /  AlkPhos  74     21 Mar 2021 06:36  TPro  6.2    /  Alb  3.0    /  TBili  0.3    /  DBili  x      /  AST  11     /  ALT  11     /  AlkPhos  73     20 Mar 2021 09:49  TPro  6.1    /  Alb  3.0    /  TBili  0.5    /  DBili  x      /  AST  12     /  ALT  12     /  AlkPhos  77     19 Mar 2021 07:16    PT/INR - ( 20 Mar 2021 09:49 )   PT: 13.5 sec;   INR: 1.16 ratio         PTT - ( 20 Mar 2021 09:49 )  PTT:28.7 sec      Blood Culture: Organism --  Gram Stain Blood -- Gram Stain --  Specimen Source .Urine Clean Catch (Midstream)  Culture-Blood --

## 2021-03-21 NOTE — PROGRESS NOTE ADULT - ASSESSMENT
a fib- rate controlled  BP- stable, on beta blocker and lowered calcium channel blocker dose, off ARB, thiazide for time being  Back on NOAC  CAD- stable  out pt f/u

## 2021-03-21 NOTE — PROGRESS NOTE ADULT - ASSESSMENT
anemia  hiatal hernia  afib    s/p recent egd/colon  will need outpatient capsule  no s/s active gi bleeding  no objection to ac w monitoring of cbc  gerd/aspiration precautions  cont ppi bid  regular diet as tolerated  will need outpatient follow up with primary GI upon dc

## 2021-03-21 NOTE — PROGRESS NOTE ADULT - ASSESSMENT
76 yo woman with history of gastritis and hiatal hernia s/p fuduplication surgery at [facility] in 7/2020, has been on oral iron supplements at home for over a year but presented with symptomatic anemia; noted to have guaiac + stools, equivocal iron stores with ferritin 95 and B12 deficiency    -started on PPI and in setting of hiatal hernia unclear if any iron absorption via GI tract  - agree to start IV iron Venofer 100mg daily X 3 doses; today is day #2 out of 3; does NOT need to stay in hospital for 3rd dose and can follow up as outpatient  - also started on B12 1000mcg po daily  - patient to follow up with Dr. Ya (GI) as outpatient to discuss capsule endoscopy  - may also need to see surgeon (Dr. Desouza) as she is s/p fuduplication surgery; may need revision  - resumed anticoagulation for A-fib; would monitor CBC closely  - case discussed with Dr. Martinez (hospitalist)

## 2021-03-22 ENCOUNTER — TRANSCRIPTION ENCOUNTER (OUTPATIENT)
Age: 76
End: 2021-03-22

## 2021-06-21 NOTE — ED ADULT NURSE NOTE - NS ED NURSE LEVEL OF CONSCIOUSNESS AFFECT
Last vitals:   Vitals:    11/23/18 0835   BP: 121/67   Pulse: 84   Resp: 14   Temp: 36.6  C (97.8  F)   SpO2: 97%     Patient's level of consciousness is drowsy  Spontaneous respirations: yes  Maintains airway independently: yes  Dentition unchanged: yes  Oropharynx: oropharynx clear of all foreign objects    QCDR Measures:  ASA# 20 - Surgical Safety Checklist: WHO surgical safety checklist completed prior to induction    PQRS# 430 - Adult PONV Prevention: 4558F - Pt received => 2 anti-emetic agents (different classes) preop & intraop  ASA# 8 - Peds PONV Prevention: NA - Not pediatric patient, not GA or 2 or more risk factors NOT present  PQRS# 424 - Madeleine-op Temp Management: 4559F - At least one body temp DOCUMENTED => 35.5C or 95.9F within required timeframe  PQRS# 426 - PACU Transfer Protocol:NA - Patient did not go to PACU  ASA# 14 - Acute Post-op Pain: NA - Patient under age 10y or did not go to PACU  
Calm

## 2022-02-09 NOTE — ED ADULT TRIAGE NOTE - SOURCE OF INFORMATION
Patient/EMS
If you are a smoker, it is important for your health to stop smoking. Please be aware that second hand smoke is also harmful.

## 2025-07-01 ENCOUNTER — APPOINTMENT (OUTPATIENT)
Dept: ORTHOPEDIC SURGERY | Facility: CLINIC | Age: 80
End: 2025-07-01